# Patient Record
Sex: MALE | Race: WHITE | NOT HISPANIC OR LATINO | Employment: OTHER | URBAN - METROPOLITAN AREA
[De-identification: names, ages, dates, MRNs, and addresses within clinical notes are randomized per-mention and may not be internally consistent; named-entity substitution may affect disease eponyms.]

---

## 2019-08-01 ENCOUNTER — APPOINTMENT (OUTPATIENT)
Dept: RADIOLOGY | Facility: MEDICAL CENTER | Age: 74
DRG: 078 | End: 2019-08-01
Attending: NURSE PRACTITIONER
Payer: MEDICARE

## 2019-08-01 ENCOUNTER — APPOINTMENT (OUTPATIENT)
Dept: RADIOLOGY | Facility: MEDICAL CENTER | Age: 74
DRG: 078 | End: 2019-08-01
Attending: EMERGENCY MEDICINE
Payer: MEDICARE

## 2019-08-01 ENCOUNTER — HOSPITAL ENCOUNTER (OUTPATIENT)
Dept: RADIOLOGY | Facility: MEDICAL CENTER | Age: 74
End: 2019-08-01

## 2019-08-01 ENCOUNTER — HOSPITAL ENCOUNTER (INPATIENT)
Facility: MEDICAL CENTER | Age: 74
LOS: 4 days | DRG: 078 | End: 2019-08-05
Attending: EMERGENCY MEDICINE | Admitting: HOSPITALIST
Payer: MEDICARE

## 2019-08-01 ENCOUNTER — HOSPITAL ENCOUNTER (OUTPATIENT)
Facility: MEDICAL CENTER | Age: 74
End: 2019-08-01

## 2019-08-01 DIAGNOSIS — I16.0 HYPERTENSIVE URGENCY: ICD-10-CM

## 2019-08-01 DIAGNOSIS — R41.82 ALTERED MENTAL STATUS, UNSPECIFIED ALTERED MENTAL STATUS TYPE: ICD-10-CM

## 2019-08-01 DIAGNOSIS — I67.4 HYPERTENSIVE ENCEPHALOPATHY: ICD-10-CM

## 2019-08-01 PROBLEM — R53.1 ACUTE LEFT-SIDED WEAKNESS: Status: ACTIVE | Noted: 2019-08-01

## 2019-08-01 PROBLEM — E66.3 OVERWEIGHT (BMI 25.0-29.9): Status: ACTIVE | Noted: 2019-08-01

## 2019-08-01 PROBLEM — E87.1 HYPONATREMIA: Status: ACTIVE | Noted: 2019-08-01

## 2019-08-01 PROBLEM — R47.1 DYSARTHRIA: Status: ACTIVE | Noted: 2019-08-01

## 2019-08-01 PROBLEM — F10.10 ALCOHOL ABUSE: Status: ACTIVE | Noted: 2019-08-01

## 2019-08-01 LAB
ABO + RH BLD: NORMAL
ABO GROUP BLD: NORMAL
ALBUMIN SERPL BCP-MCNC: 4.3 G/DL (ref 3.2–4.9)
ALBUMIN/GLOB SERPL: 1.3 G/DL
ALP SERPL-CCNC: 104 U/L (ref 30–99)
ALT SERPL-CCNC: 13 U/L (ref 2–50)
ANION GAP SERPL CALC-SCNC: 10 MMOL/L (ref 0–11.9)
APPEARANCE UR: CLEAR
APTT PPP: 28 SEC (ref 24.7–36)
AST SERPL-CCNC: 29 U/L (ref 12–45)
BACTERIA #/AREA URNS HPF: NEGATIVE /HPF
BASOPHILS # BLD AUTO: 0.6 % (ref 0–1.8)
BASOPHILS # BLD: 0.04 K/UL (ref 0–0.12)
BILIRUB SERPL-MCNC: 1.2 MG/DL (ref 0.1–1.5)
BILIRUB UR QL STRIP.AUTO: NEGATIVE
BLD GP AB SCN SERPL QL: NORMAL
BUN SERPL-MCNC: 13 MG/DL (ref 8–22)
CALCIUM SERPL-MCNC: 9.6 MG/DL (ref 8.5–10.5)
CHLORIDE SERPL-SCNC: 96 MMOL/L (ref 96–112)
CO2 SERPL-SCNC: 23 MMOL/L (ref 20–33)
COLOR UR: YELLOW
CREAT SERPL-MCNC: 1.26 MG/DL (ref 0.5–1.4)
EKG IMPRESSION: NORMAL
EOSINOPHIL # BLD AUTO: 0.01 K/UL (ref 0–0.51)
EOSINOPHIL NFR BLD: 0.1 % (ref 0–6.9)
EPI CELLS #/AREA URNS HPF: NEGATIVE /HPF
ERYTHROCYTE [DISTWIDTH] IN BLOOD BY AUTOMATED COUNT: 48 FL (ref 35.9–50)
GLOBULIN SER CALC-MCNC: 3.3 G/DL (ref 1.9–3.5)
GLUCOSE SERPL-MCNC: 102 MG/DL (ref 65–99)
GLUCOSE UR STRIP.AUTO-MCNC: NEGATIVE MG/DL
HCT VFR BLD AUTO: 40.4 % (ref 42–52)
HGB BLD-MCNC: 14.1 G/DL (ref 14–18)
HYALINE CASTS #/AREA URNS LPF: ABNORMAL /LPF
IMM GRANULOCYTES # BLD AUTO: 0.04 K/UL (ref 0–0.11)
IMM GRANULOCYTES NFR BLD AUTO: 0.6 % (ref 0–0.9)
INR PPP: 1.02 (ref 0.87–1.13)
KETONES UR STRIP.AUTO-MCNC: NEGATIVE MG/DL
LEUKOCYTE ESTERASE UR QL STRIP.AUTO: ABNORMAL
LYMPHOCYTES # BLD AUTO: 0.74 K/UL (ref 1–4.8)
LYMPHOCYTES NFR BLD: 10.9 % (ref 22–41)
MCH RBC QN AUTO: 34.1 PG (ref 27–33)
MCHC RBC AUTO-ENTMCNC: 34.9 G/DL (ref 33.7–35.3)
MCV RBC AUTO: 97.6 FL (ref 81.4–97.8)
MICRO URNS: ABNORMAL
MONOCYTES # BLD AUTO: 0.52 K/UL (ref 0–0.85)
MONOCYTES NFR BLD AUTO: 7.7 % (ref 0–13.4)
NEUTROPHILS # BLD AUTO: 5.42 K/UL (ref 1.82–7.42)
NEUTROPHILS NFR BLD: 80.1 % (ref 44–72)
NITRITE UR QL STRIP.AUTO: NEGATIVE
NRBC # BLD AUTO: 0 K/UL
NRBC BLD-RTO: 0 /100 WBC
PH UR STRIP.AUTO: 6.5 [PH] (ref 5–8)
PLATELET # BLD AUTO: 150 K/UL (ref 164–446)
PMV BLD AUTO: 9.3 FL (ref 9–12.9)
POTASSIUM SERPL-SCNC: 4.2 MMOL/L (ref 3.6–5.5)
PROT SERPL-MCNC: 7.6 G/DL (ref 6–8.2)
PROT UR QL STRIP: NEGATIVE MG/DL
PROTHROMBIN TIME: 13.6 SEC (ref 12–14.6)
RBC # BLD AUTO: 4.14 M/UL (ref 4.7–6.1)
RBC # URNS HPF: ABNORMAL /HPF
RBC UR QL AUTO: NEGATIVE
RH BLD: NORMAL
SODIUM SERPL-SCNC: 129 MMOL/L (ref 135–145)
SP GR UR STRIP.AUTO: 1.01
TROPONIN T SERPL-MCNC: 18 NG/L (ref 6–19)
UROBILINOGEN UR STRIP.AUTO-MCNC: 0.2 MG/DL
WBC # BLD AUTO: 6.8 K/UL (ref 4.8–10.8)
WBC #/AREA URNS HPF: ABNORMAL /HPF

## 2019-08-01 PROCEDURE — 81001 URINALYSIS AUTO W/SCOPE: CPT

## 2019-08-01 PROCEDURE — 83036 HEMOGLOBIN GLYCOSYLATED A1C: CPT

## 2019-08-01 PROCEDURE — 96375 TX/PRO/DX INJ NEW DRUG ADDON: CPT

## 2019-08-01 PROCEDURE — 84484 ASSAY OF TROPONIN QUANT: CPT

## 2019-08-01 PROCEDURE — 93005 ELECTROCARDIOGRAM TRACING: CPT | Performed by: EMERGENCY MEDICINE

## 2019-08-01 PROCEDURE — 770006 HCHG ROOM/CARE - MED/SURG/GYN SEMI*

## 2019-08-01 PROCEDURE — 0042T CT-CEREBRAL PERFUSION ANALYSIS: CPT

## 2019-08-01 PROCEDURE — 96366 THER/PROPH/DIAG IV INF ADDON: CPT

## 2019-08-01 PROCEDURE — 80053 COMPREHEN METABOLIC PANEL: CPT

## 2019-08-01 PROCEDURE — 700101 HCHG RX REV CODE 250: Performed by: HOSPITALIST

## 2019-08-01 PROCEDURE — 86901 BLOOD TYPING SEROLOGIC RH(D): CPT

## 2019-08-01 PROCEDURE — 700111 HCHG RX REV CODE 636 W/ 250 OVERRIDE (IP): Performed by: EMERGENCY MEDICINE

## 2019-08-01 PROCEDURE — 85730 THROMBOPLASTIN TIME PARTIAL: CPT

## 2019-08-01 PROCEDURE — 36415 COLL VENOUS BLD VENIPUNCTURE: CPT

## 2019-08-01 PROCEDURE — 70498 CT ANGIOGRAPHY NECK: CPT

## 2019-08-01 PROCEDURE — 99284 EMERGENCY DEPT VISIT MOD MDM: CPT | Performed by: PSYCHIATRY & NEUROLOGY

## 2019-08-01 PROCEDURE — 86900 BLOOD TYPING SEROLOGIC ABO: CPT

## 2019-08-01 PROCEDURE — 70496 CT ANGIOGRAPHY HEAD: CPT

## 2019-08-01 PROCEDURE — 99223 1ST HOSP IP/OBS HIGH 75: CPT | Performed by: HOSPITALIST

## 2019-08-01 PROCEDURE — 99285 EMERGENCY DEPT VISIT HI MDM: CPT

## 2019-08-01 PROCEDURE — 86850 RBC ANTIBODY SCREEN: CPT

## 2019-08-01 PROCEDURE — 85610 PROTHROMBIN TIME: CPT

## 2019-08-01 PROCEDURE — 71045 X-RAY EXAM CHEST 1 VIEW: CPT

## 2019-08-01 PROCEDURE — 700117 HCHG RX CONTRAST REV CODE 255: Performed by: EMERGENCY MEDICINE

## 2019-08-01 PROCEDURE — 70551 MRI BRAIN STEM W/O DYE: CPT

## 2019-08-01 PROCEDURE — 85025 COMPLETE CBC W/AUTO DIFF WBC: CPT

## 2019-08-01 PROCEDURE — 96365 THER/PROPH/DIAG IV INF INIT: CPT

## 2019-08-01 RX ORDER — ASPIRIN 81 MG/1
324 TABLET, CHEWABLE ORAL DAILY
Status: DISCONTINUED | OUTPATIENT
Start: 2019-08-01 | End: 2019-08-04

## 2019-08-01 RX ORDER — LORAZEPAM 2 MG/ML
0.5 INJECTION INTRAMUSCULAR EVERY 4 HOURS PRN
Status: DISCONTINUED | OUTPATIENT
Start: 2019-08-01 | End: 2019-08-05 | Stop reason: HOSPADM

## 2019-08-01 RX ORDER — CLOPIDOGREL BISULFATE 75 MG/1
75 TABLET ORAL DAILY
Status: DISCONTINUED | OUTPATIENT
Start: 2019-08-01 | End: 2019-08-05 | Stop reason: HOSPADM

## 2019-08-01 RX ORDER — FOLIC ACID 1 MG/1
1 TABLET ORAL DAILY
Status: DISCONTINUED | OUTPATIENT
Start: 2019-08-02 | End: 2019-08-05 | Stop reason: HOSPADM

## 2019-08-01 RX ORDER — AMLODIPINE BESYLATE 2.5 MG/1
2.5 TABLET ORAL
Status: DISCONTINUED | OUTPATIENT
Start: 2019-08-02 | End: 2019-08-02

## 2019-08-01 RX ORDER — ONDANSETRON 2 MG/ML
4 INJECTION INTRAMUSCULAR; INTRAVENOUS EVERY 4 HOURS PRN
Status: DISCONTINUED | OUTPATIENT
Start: 2019-08-01 | End: 2019-08-05 | Stop reason: HOSPADM

## 2019-08-01 RX ORDER — AMOXICILLIN 250 MG
2 CAPSULE ORAL 2 TIMES DAILY
Status: DISCONTINUED | OUTPATIENT
Start: 2019-08-02 | End: 2019-08-05 | Stop reason: HOSPADM

## 2019-08-01 RX ORDER — ONDANSETRON 4 MG/1
4 TABLET, ORALLY DISINTEGRATING ORAL EVERY 4 HOURS PRN
Status: DISCONTINUED | OUTPATIENT
Start: 2019-08-01 | End: 2019-08-05 | Stop reason: HOSPADM

## 2019-08-01 RX ORDER — LISINOPRIL 10 MG/1
10 TABLET ORAL TWICE DAILY
Status: DISCONTINUED | OUTPATIENT
Start: 2019-08-01 | End: 2019-08-02

## 2019-08-01 RX ORDER — LORAZEPAM 2 MG/1
4 TABLET ORAL
Status: DISCONTINUED | OUTPATIENT
Start: 2019-08-01 | End: 2019-08-05 | Stop reason: HOSPADM

## 2019-08-01 RX ORDER — POLYETHYLENE GLYCOL 3350 17 G/17G
1 POWDER, FOR SOLUTION ORAL
Status: DISCONTINUED | OUTPATIENT
Start: 2019-08-01 | End: 2019-08-05 | Stop reason: HOSPADM

## 2019-08-01 RX ORDER — ASPIRIN 325 MG
325 TABLET ORAL DAILY
Status: DISCONTINUED | OUTPATIENT
Start: 2019-08-01 | End: 2019-08-04

## 2019-08-01 RX ORDER — LORAZEPAM 2 MG/1
2 TABLET ORAL
Status: DISCONTINUED | OUTPATIENT
Start: 2019-08-01 | End: 2019-08-05 | Stop reason: HOSPADM

## 2019-08-01 RX ORDER — LORAZEPAM 2 MG/ML
2 INJECTION INTRAMUSCULAR
Status: DISCONTINUED | OUTPATIENT
Start: 2019-08-01 | End: 2019-08-05 | Stop reason: HOSPADM

## 2019-08-01 RX ORDER — LORAZEPAM 1 MG/1
0.5 TABLET ORAL EVERY 4 HOURS PRN
Status: DISCONTINUED | OUTPATIENT
Start: 2019-08-01 | End: 2019-08-05 | Stop reason: HOSPADM

## 2019-08-01 RX ORDER — AMLODIPINE BESYLATE 5 MG/1
2.5 TABLET ORAL DAILY
Status: DISCONTINUED | OUTPATIENT
Start: 2019-08-01 | End: 2019-08-01

## 2019-08-01 RX ORDER — THIAMINE MONONITRATE (VIT B1) 100 MG
100 TABLET ORAL DAILY
Status: DISCONTINUED | OUTPATIENT
Start: 2019-08-02 | End: 2019-08-05 | Stop reason: HOSPADM

## 2019-08-01 RX ORDER — LORAZEPAM 2 MG/ML
1 INJECTION INTRAMUSCULAR ONCE
Status: COMPLETED | OUTPATIENT
Start: 2019-08-01 | End: 2019-08-01

## 2019-08-01 RX ORDER — LORAZEPAM 1 MG/1
1 TABLET ORAL EVERY 4 HOURS PRN
Status: DISCONTINUED | OUTPATIENT
Start: 2019-08-01 | End: 2019-08-05 | Stop reason: HOSPADM

## 2019-08-01 RX ORDER — LISINOPRIL 10 MG/1
10 TABLET ORAL 2 TIMES DAILY
Status: DISCONTINUED | OUTPATIENT
Start: 2019-08-01 | End: 2019-08-01

## 2019-08-01 RX ORDER — ASPIRIN 300 MG/1
300 SUPPOSITORY RECTAL DAILY
Status: DISCONTINUED | OUTPATIENT
Start: 2019-08-01 | End: 2019-08-04

## 2019-08-01 RX ORDER — LORAZEPAM 2 MG/ML
1 INJECTION INTRAMUSCULAR
Status: DISCONTINUED | OUTPATIENT
Start: 2019-08-01 | End: 2019-08-05 | Stop reason: HOSPADM

## 2019-08-01 RX ORDER — ACETAMINOPHEN 325 MG/1
650 TABLET ORAL EVERY 6 HOURS PRN
Status: DISCONTINUED | OUTPATIENT
Start: 2019-08-01 | End: 2019-08-05 | Stop reason: HOSPADM

## 2019-08-01 RX ORDER — ATORVASTATIN CALCIUM 40 MG/1
40 TABLET, FILM COATED ORAL
Status: DISCONTINUED | OUTPATIENT
Start: 2019-08-01 | End: 2019-08-02

## 2019-08-01 RX ORDER — LORAZEPAM 2 MG/ML
1.5 INJECTION INTRAMUSCULAR
Status: DISCONTINUED | OUTPATIENT
Start: 2019-08-01 | End: 2019-08-05 | Stop reason: HOSPADM

## 2019-08-01 RX ORDER — BISACODYL 10 MG
10 SUPPOSITORY, RECTAL RECTAL
Status: DISCONTINUED | OUTPATIENT
Start: 2019-08-01 | End: 2019-08-05 | Stop reason: HOSPADM

## 2019-08-01 RX ADMIN — IOHEXOL 100 ML: 350 INJECTION, SOLUTION INTRAVENOUS at 14:45

## 2019-08-01 RX ADMIN — LORAZEPAM 1 MG: 2 INJECTION INTRAMUSCULAR; INTRAVENOUS at 17:46

## 2019-08-01 RX ADMIN — IOHEXOL 40 ML: 350 INJECTION, SOLUTION INTRAVENOUS at 14:45

## 2019-08-01 RX ADMIN — THIAMINE HYDROCHLORIDE: 100 INJECTION, SOLUTION INTRAMUSCULAR; INTRAVENOUS at 18:37

## 2019-08-01 ASSESSMENT — LIFESTYLE VARIABLES
ON A TYPICAL DAY WHEN YOU DRINK ALCOHOL HOW MANY DRINKS DO YOU HAVE: 6
HAVE YOU EVER FELT YOU SHOULD CUT DOWN ON YOUR DRINKING: YES
TOTAL SCORE: 4
AVERAGE NUMBER OF DAYS PER WEEK YOU HAVE A DRINK CONTAINING ALCOHOL: 7
TOTAL SCORE: 4
HOW MANY TIMES IN THE PAST YEAR HAVE YOU HAD 5 OR MORE DRINKS IN A DAY: 365
CONSUMPTION TOTAL: POSITIVE
EVER_SMOKED: YES
DOES PATIENT WANT TO STOP DRINKING: NO
TOTAL SCORE: 4
DOES PATIENT WANT TO TALK TO SOMEONE ABOUT QUITTING: NO
ALCOHOL_USE: YES
HAVE PEOPLE ANNOYED YOU BY CRITICIZING YOUR DRINKING: YES
EVER HAD A DRINK FIRST THING IN THE MORNING TO STEADY YOUR NERVES TO GET RID OF A HANGOVER: YES
SUBSTANCE_ABUSE: 1
EVER FELT BAD OR GUILTY ABOUT YOUR DRINKING: YES

## 2019-08-01 ASSESSMENT — ENCOUNTER SYMPTOMS
ABDOMINAL PAIN: 0
FOCAL WEAKNESS: 0
SORE THROAT: 0
SENSORY CHANGE: 0
TREMORS: 0
NAUSEA: 0
MYALGIAS: 0
DOUBLE VISION: 0
PALPITATIONS: 0
DEPRESSION: 0
NECK PAIN: 0
SHORTNESS OF BREATH: 0
DIZZINESS: 0
HEADACHES: 0
COUGH: 0

## 2019-08-01 ASSESSMENT — PATIENT HEALTH QUESTIONNAIRE - PHQ9
SUM OF ALL RESPONSES TO PHQ9 QUESTIONS 1 AND 2: 0
2. FEELING DOWN, DEPRESSED, IRRITABLE, OR HOPELESS: NOT AT ALL
1. LITTLE INTEREST OR PLEASURE IN DOING THINGS: NOT AT ALL

## 2019-08-01 NOTE — ED NOTES
Report from Sharath RN, assumed care of pt.     Pt sitting up in stretcher, alert to self, time and event only. No facial droop noted. Speech clear but appears to have difficulty finding words at times. No extremity weakness noted. Denies pain.

## 2019-08-01 NOTE — CONSULTS
Chief Complaint   Patient presents with   • Possible Stroke     onset of confusion and difficulty speaking this morning at 0900.       Problem List Items Addressed This Visit     None      Neurology Stroke Alert Consultation     History of present illness:  This is a 74-year old male with PMHx significant for hypertension, dyslipidemia, TIAs (remotely) who presented to [facility] as a transfer from [facility] on 8/1/19 for a chief complaint of confusion and hypertensive crisis. HPI obtained from EMS at time of arrival. Patient was reportedly LKW around 0900 this morning; went to grocery store, was seen leaving and walking to his car; 1-2 hours later, patient was found by bystanders confused and with minimal verbal output. EMS was called; on scene SBP 200s, reportedly up to 260 systolic. Some reports of Left sided weakness as well. Patient was thus brought to [place] for further evaluation; at time of presentation there, CT head revealed no acute intracranial abnormality. Patient was ultimately transferred to [facility] for further work up/higher level of care. At time of presentation here, SBP 190s. NIHSS 1-2, significant only for mild confusion and question of mild word finding difficulty/fragmented speech. CTA head/neck at time of presentation here with no acute thrombus or LVO; did not multiple areas of calcification/atherosclerotic disease involving posterior circulation. Patient ultimately determined not to be a candidate for IV tPA secondary to mild/non disabling/ non focal deficits; patient also outside of tPA time window.   Currently, patient is sitting up in bed; awake and alert, still with mild confusion and word finding difficulty. Denies headache or dizziness. Denies numbness or paresthesia to any part of the body. Denies unilateral/focal weakness to any part of the body. Denies problem with vision, speech or swallowing. Denies chest pain or heart palpitations.     Neurology has been consulted by  Dr. Ritesh Gomez to further evaluate the findings noted above.     Past medical history:   Past Medical History:   Diagnosis Date   • Hyperlipidemia    • Hypertension    • TIA (transient ischemic attack)        Past surgical history:   Non contributory.     Family history:   Non contributory.     Social history:   Social History     Socioeconomic History   • Marital status:      Spouse name: Not on file   • Number of children: Not on file   • Years of education: Not on file   • Highest education level: Not on file   Occupational History   • Not on file   Social Needs   • Financial resource strain: Not on file   • Food insecurity:     Worry: Not on file     Inability: Not on file   • Transportation needs:     Medical: Not on file     Non-medical: Not on file   Tobacco Use   • Smoking status: Former Smoker     Last attempt to quit: 1999     Years since quittin.0   • Smokeless tobacco: Never Used   Substance and Sexual Activity   • Alcohol use: Yes   • Drug use: Never   • Sexual activity: Not on file   Lifestyle   • Physical activity:     Days per week: Not on file     Minutes per session: Not on file   • Stress: Not on file   Relationships   • Social connections:     Talks on phone: Not on file     Gets together: Not on file     Attends Orthodoxy service: Not on file     Active member of club or organization: Not on file     Attends meetings of clubs or organizations: Not on file     Relationship status: Not on file   • Intimate partner violence:     Fear of current or ex partner: Not on file     Emotionally abused: Not on file     Physically abused: Not on file     Forced sexual activity: Not on file   Other Topics Concern   • Not on file   Social History Narrative   • Not on file       Current medications:   No current facility-administered medications for this encounter.      Current Outpatient Medications   Medication   • lisinopril (PRINIVIL) 10 MG Tab   • clopidogrel (PLAVIX) 75 MG Tab   •  amLODIPine (NORVASC) 2.5 MG Tab   • atorvastatin (LIPITOR) 20 MG Tab       Medication Allergy:  No Known Allergies    Review of systems:   Constitutional: denies fever, night sweats, weight loss.   Eyes: denies acute vision change, eye pain or secretion.   Ears, Nose, Mouth, Throat: denies nasal secretion, nasal bleeding, difficulty swallowing, hearing loss, tinnitus, vertigo, ear pain, acute dental problems, oral ulcers or lesions.   Endocrine: denies recent weight changes, heat or cold intolerance, polyuria, polydypsia, polyphagia,abnormal hair growth.  Cardiovascular: denies new onset of chest pain, palpitations, syncope, or dyspnea of exertion.  Pulmonary: denies shortness of breath, new onset of cough, hemoptysis, wheezing, chest pain or flu-like symptoms.   GI: denies nausea, vomiting, diarrhea, GI bleeding, change in appetite, abdominal pain, and change in bowel habits.  : denies dysuria, urinary incontinence, hematuria.  Heme/oncology: denies history of easy bruising or bleeding. No history of cancer, DVTor PE.  Allergy/immunology: denies hives/urticaria, or itching.   Dermatologic: denies new rash, or new skin lesions.  Musculoskeletal:denies joint swelling or pain, muscle pain, neck and back pain. Neurologic: denies headaches, acute visual changes, facial droopiness, muscle weakness (focal or generalized), paresthesias, anesthesia, ataxia, change in speech or language, memory loss, abnormal movements, seizures, loss of consciousness, or episodes of confusion.   Psychiatric: denies symptoms of depression, anxiety, hallucinations, mood swings or changes, suicidal or homicidal thoughts.     Physical examination:   Vitals:    08/01/19 1422   BP: (!) 173/89   Pulse: 92   SpO2: 95%   Weight: 99.8 kg (220 lb)   Height: 1.829 m (6')     General: Patient in no acute distress  HEENT: Normocephalic, no signs of acute trauma.   Neck: supple, no meningeal signs or carotid bruits. There is normal range of motion. No  tenderness on exam.   Chest: clear to auscultation. No cough.   CV: RRR, no murmurs.   Skin: no signs of acute rashes or trauma.   Musculoskeletal: joints exhibit full range of motion, without any pain to palpation. There are no signs of joint or muscle swelling. There is no tenderness to deep palpation of muscles.   Psychiatric: No hallucinatory behavior. Denies symptoms of depression or suicidal ideation. Mood and affect appear normal on exam.     NEUROLOGICAL EXAM:   Mental status, orientation: Awake, alert and fully oriented.   Speech and language: speech is clear, however confused/fragmented at times with some minimal word finding difficulty.   Cranial nerve exam: Pupils are 3-4 mm bilaterally and equally reactive to light and accommodation. Visual fields are intact by confrontation. There is no nystagmus on primary or secondary gaze. Intact full EOM in all directions of gaze. Face appears symmetric. Sensation in the face is intact to light touch.Tongue is midline and without any signs of tongue biting or fasciculations Shoulder shrug is intact bilaterally.   Motor exam: Strength is 5/5 in all extremities. Tone is normal. No abnormal movements were seen on exam.   Sensory exam reveals normal sense of light touch and pinprick in all extremities.   Deep tendon reflexes:  2+ throughout. Plantar responses are flexor. There is no clonus.   Coordination: shows a normal finger-nose-finger. Normal rapidly alternating movements.   Gait: Not assessed at this time as patient is a fall risk.       NIH Stroke Scale    1a Level of Consciousness   1b Orientation Questions 1  1c Response to Commands   2 Gaze   3 Visual Fields   4 Facial Movement   5 Motor Function (arm)   a Left   b Right   6 Motor Function (leg)   a Left   b Right   7 Limb Ataxia   8 Sensory   9 Language 1  10 Articulation   11 Extinction/Inattention     Score: 2    ANCILLARY DATA REVIEWED:     Lab Data Review:  Recent Results (from the past 24 hour(s))   CBC  WITH DIFFERENTIAL    Collection Time: 08/01/19 11:25 AM   Result Value Ref Range    WBC 7.2 4.8 - 10.8 K/uL    RBC 4.23 (L) 4.70 - 6.10 M/uL    Hemoglobin 14.2 14.0 - 18.0 g/dL    Hematocrit 40.1 (L) 42.0 - 52.0 %    MCV 94.8 (H) 80.0 - 94.0 fL    MCH 33.6 (H) 28.7 - 33.1 pg    MCHC 35.4 33.0 - 37.0 g/dL    RDW 13.2 11.5 - 14.5 %    Platelet Count 174 130 - 400 K/uL    MPV 9.3 7.4 - 10.4 fL    Neutrophils Automated 78.9 (H) 39.0 - 70.0 %    Lymphocytes Automated 11.7 (L) 21.0 - 50.0 %    Monocytes Automated 8.7 1.7 - 10.0 %    Eosinophils Automated 0.3 0.0 - 5.0 %    Basophils Automated 0.4 0.0 - 3.0 %    Abs Neutrophils Automated 5.6 1.8 - 7.7 K/uL    Abs Lymph Automated 0.8 (L) 1.2 - 4.8 K/uL    Monos (Absolute) 0.6 0.2 - 0.9 K/uL   COMP METABOLIC PANEL    Collection Time: 08/01/19 11:25 AM   Result Value Ref Range    Sodium 130 (L) 136 - 145 mmol/L    Potassium 4.2 3.5 - 5.1 mmol/L    Chloride 97 (L) 98 - 107 mmol/L    Co2 19 (L) 20 - 29 mmol/L    Anion Gap 14 (H) 4 - 12 mmol/L    Glucose 103 (H) 70 - 100 mg/dL    Bun 12 9 - 25 mg/dL    Creatinine 1.3 0.7 - 1.3 mg/dL    Calcium 9.3 8.5 - 10.1 mg/dL    AST(SGOT) 37 10 - 37 U/L    ALT(SGPT) 20 12 - 78 U/L    Alkaline Phosphatase 118 34 - 120 U/L    Total Bilirubin 1.2 0.1 - 1.2 mg/dL    Albumin 4.1 3.5 - 5.0 g/dL    Total Protein 8.0 6.4 - 8.3 g/dL    A-G Ratio 1.1    TROPONIN    Collection Time: 08/01/19 11:25 AM   Result Value Ref Range    Troponin I <0.02 0.00 - 0.06 ng/mL   LACTIC ACID    Collection Time: 08/01/19 11:25 AM   Result Value Ref Range    Lactic Acid 1.5 0.0 - 2.0 mmol/L   ESTIMATED GFR    Collection Time: 08/01/19 11:25 AM   Result Value Ref Range    GFR If African American >60 >60 mL/min/1.73 m 2    GFR If Non African American 54 (A) >60 mL/min/1.73 m 2   CBC WITH DIFFERENTIAL    Collection Time: 08/01/19  2:00 PM   Result Value Ref Range    WBC 6.8 4.8 - 10.8 K/uL    RBC 4.14 (L) 4.70 - 6.10 M/uL    Hemoglobin 14.1 14.0 - 18.0 g/dL    Hematocrit  40.4 (L) 42.0 - 52.0 %    MCV 97.6 81.4 - 97.8 fL    MCH 34.1 (H) 27.0 - 33.0 pg    MCHC 34.9 33.7 - 35.3 g/dL    RDW 48.0 35.9 - 50.0 fL    Platelet Count 150 (L) 164 - 446 K/uL    MPV 9.3 9.0 - 12.9 fL    Neutrophils-Polys 80.10 (H) 44.00 - 72.00 %    Lymphocytes 10.90 (L) 22.00 - 41.00 %    Monocytes 7.70 0.00 - 13.40 %    Eosinophils 0.10 0.00 - 6.90 %    Basophils 0.60 0.00 - 1.80 %    Immature Granulocytes 0.60 0.00 - 0.90 %    Nucleated RBC 0.00 /100 WBC    Neutrophils (Absolute) 5.42 1.82 - 7.42 K/uL    Lymphs (Absolute) 0.74 (L) 1.00 - 4.80 K/uL    Monos (Absolute) 0.52 0.00 - 0.85 K/uL    Eos (Absolute) 0.01 0.00 - 0.51 K/uL    Baso (Absolute) 0.04 0.00 - 0.12 K/uL    Immature Granulocytes (abs) 0.04 0.00 - 0.11 K/uL    NRBC (Absolute) 0.00 K/uL   COMP METABOLIC PANEL    Collection Time: 08/01/19  2:00 PM   Result Value Ref Range    Sodium 129 (L) 135 - 145 mmol/L    Potassium 4.2 3.6 - 5.5 mmol/L    Chloride 96 96 - 112 mmol/L    Co2 23 20 - 33 mmol/L    Anion Gap 10.0 0.0 - 11.9    Glucose 102 (H) 65 - 99 mg/dL    Bun 13 8 - 22 mg/dL    Creatinine 1.26 0.50 - 1.40 mg/dL    Calcium 9.6 8.5 - 10.5 mg/dL    AST(SGOT) 29 12 - 45 U/L    ALT(SGPT) 13 2 - 50 U/L    Alkaline Phosphatase 104 (H) 30 - 99 U/L    Total Bilirubin 1.2 0.1 - 1.5 mg/dL    Albumin 4.3 3.2 - 4.9 g/dL    Total Protein 7.6 6.0 - 8.2 g/dL    Globulin 3.3 1.9 - 3.5 g/dL    A-G Ratio 1.3 g/dL   PROTHROMBIN TIME    Collection Time: 08/01/19  2:00 PM   Result Value Ref Range    PT 13.6 12.0 - 14.6 sec    INR 1.02 0.87 - 1.13   APTT    Collection Time: 08/01/19  2:00 PM   Result Value Ref Range    APTT 28.0 24.7 - 36.0 sec   TROPONIN    Collection Time: 08/01/19  2:00 PM   Result Value Ref Range    Troponin T 18 6 - 19 ng/L   ESTIMATED GFR    Collection Time: 08/01/19  2:00 PM   Result Value Ref Range    GFR If African American >60 >60 mL/min/1.73 m 2    GFR If Non  56 (A) >60 mL/min/1.73 m 2       Labs reviewed by me.     Imaging  reviewed by me:     DX-CHEST-PORTABLE (1 VIEW)   Final Result      Borderline cardiomegaly.      CT-CTA HEAD WITH & W/O-POST PROCESS   Final Result      CT angiogram of the [place] within normal limits.   Cerebral atrophy and central microvascular ischemic changes.   No acute intracranial hemorrhage.      CT-CTA NECK WITH & W/O-POST PROCESSING   Final Result      1.  Atherosclerotic plaque at the carotid bifurcations bilaterally with extension into the internal carotid arteries. This results in less than 50% diameter narrowing.      2.  Scattered atherosclerotic plaque involving the vertebral arteries. No evidence of occlusion or dissection.      CT-CEREBRAL PERFUSION ANALYSIS   Final Result      1.  Cerebral blood flow less than 30% likely representing completed infarct = 0 mL.      2.  T Max more than 6 seconds likely representing combination of completed infarct and ischemia = 115 mL.      3.  Mismatched volume likely representing ischemic brain/penumbra = 115 mL      4.  Please note that the cerebral perfusion was performed on the limited brain tissue around the basal ganglia region. Infarct/ischemia outside the CT perfusion sections can be missed in this study.      OUTSIDE IMAGES-CT HEAD   Final Result          Presumed mechanism by TOAST:  __Large Artery Atherosclerosis  __Small Vessel (Lacunar)  __Cardioembolic  __Other (Sickle Cell, Vasculitis, Hypercoagulable)  _X_Unknown      ASSESSMENT AND PLAN:  74-year old male with PMHx significant for hypertension, dyslipidemia, TIAs (remotely) who presented to [facility] as a transfer from [facility] on 8/1/19 for a chief complaint of confusion and hypertensive crisis;  at OSH prior to tranfer. NIHSS 1-2 at time of presentation here significant only for mild confusion and mild word finding difficulty; no other focal deficits appreciated. SBP currently 170s-190s. CT head, CTA head and neck with no LVO/acute thrombus. Patient ultimately  determined not to be a candidate for IV tPA secondary to mild/non disabling neurological deficits and higher suspicion for a hypertensive encephalopathy; patient also outside of tPA time window.  Regardless will pursue further stroke work up including MRI Brain, TTE.     Impression:   Hypertensive Crisis.   Hypertensive encephalopathy secondary to the above vs. Acute ischemic stroke.   Hx of hypertension.   Hx of dyslipidemia.   Hx of TIAs.     Recommendations/Plan:     -q4h and PRN neuro assessment. VS per nursing/unit protocol. Permissive HTN ok x 24 hours, not to exceed SBP > 200, DPB > 105. If MRI negative, BP goal 140/90 with slow downard titration so as to not hypoperfuse.   -Obtain MRI Brain wo contrast.   -Telemetry; currently SR. Screen for Afib/arrhythmia. Obtain TTE with bubble study.   -Give  mg PO now STAT and q day.   -Atorvastatin 80 mg PO q HS. Check lipid panel.   -Check CBC, CMP, Coags, Troponin, UA, TSH.   -Recommend aggressive BG management per primary team. Avoid IVF with Dextrose. Check hemoglobin A1c.   -PT/OT/SLP eval and treat.   -All other medical management per primary team.   -DVT Prophylaxis: SCDs.     Will follow up with results of the above and make additional recommendations accordingly. The plan of care above has been discussed with Dr. Camp.     Erica Gastelum MSN, BSN, AGNP-C  [facility]

## 2019-08-01 NOTE — ED NOTES
Med Rec completed per patient's home pharmacy   Allergies reviewed  No ORAL antibiotics in last 14 days    Patient states that it has been about a week since he has taken any of his medications

## 2019-08-01 NOTE — ED NOTES
Pt requesting to use restroom, up to bathroom with one person assist. Gait noted to NOT be steady without assistance.     Pt assisted to bathroom with this RN, urine cup given to provide specimen. Pt confused while trying to give sample. Small amount obtained, sent to lab.

## 2019-08-01 NOTE — ED TRIAGE NOTES
Chief Complaint   Patient presents with   • Possible Stroke     onset of confusion and difficulty speaking this morning at 0900.     BIB [facility] from [facility] for above. Given NTG paste to right upper chest and ASA PTA. Explained triage process, to waiting room. Asked to inform RN if questions or concerns arise.

## 2019-08-01 NOTE — ED PROVIDER NOTES
ED Provider Note    CHIEF COMPLAINT  Chief Complaint   Patient presents with   • Possible Stroke     onset of confusion and difficulty speaking this morning at 0900.       HPI  Phillip Christopher is a 74 y.o. male who presents with confusion.  The patient was transferred emergently from [facility] for possible CVA.  The last time the patient was seen normal was around 9 AM.  The patient was found in his car at the hospital with confusion and inability to get out of his car.  The patient was aware of the confusion.  They are concerned for some word salad as well and the patient had a NIH stroke scale of 4 at their facility and he was transferred to [facility] for possible emergent neurologic intervention.  On arrival the patient does not have any paresthesias or functional loss of his extremities.  He does have continued confusion.  He does not have any pain.    REVIEW OF SYSTEMS  See HPI for further details. All other systems are negative.     PAST MEDICAL HISTORY  Past Medical History:   Diagnosis Date   • Hyperlipidemia    • Hypertension    • TIA (transient ischemic attack)        FAMILY HISTORY  [unfilled]    SOCIAL HISTORY  Social History     Socioeconomic History   • Marital status:      Spouse name: Not on file   • Number of children: Not on file   • Years of education: Not on file   • Highest education level: Not on file   Occupational History   • Not on file   Social Needs   • Financial resource strain: Not on file   • Food insecurity:     Worry: Not on file     Inability: Not on file   • Transportation needs:     Medical: Not on file     Non-medical: Not on file   Tobacco Use   • Smoking status: Former Smoker     Last attempt to quit: 1999     Years since quittin.0   • Smokeless tobacco: Never Used   Substance and Sexual Activity   • Alcohol use: Yes   • Drug use: Never   • Sexual activity: Not on file   Lifestyle   • Physical activity:     Days per week: Not on file     Minutes  per session: Not on file   • Stress: Not on file   Relationships   • Social connections:     Talks on phone: Not on file     Gets together: Not on file     Attends Buddhist service: Not on file     Active member of club or organization: Not on file     Attends meetings of clubs or organizations: Not on file     Relationship status: Not on file   • Intimate partner violence:     Fear of current or ex partner: Not on file     Emotionally abused: Not on file     Physically abused: Not on file     Forced sexual activity: Not on file   Other Topics Concern   • Not on file   Social History Narrative   • Not on file       SURGICAL HISTORY  No past surgical history on file.    CURRENT MEDICATIONS  Home Medications    **Home medications have not yet been reviewed for this encounter**         ALLERGIES  No Known Allergies    PHYSICAL EXAM  VITAL SIGNS: BP (!) 173/89   Pulse 92   Ht 1.829 m (6')   Wt 99.8 kg (220 lb)   SpO2 95%   BMI 29.84 kg/m²       Constitutional: Well developed, Well nourished, No acute distress, Non-toxic appearance.   HENT: Normocephalic, Atraumatic, Bilateral external ears normal, Oropharynx moist, No oral exudates, Nose normal.   Eyes: PERRLA, EOMI, Conjunctiva normal, No discharge.   Neck: Normal range of motion, No tenderness, Supple, No stridor.   Lymphatic: No lymphadenopathy noted.   Cardiovascular: Normal heart rate, Normal rhythm, No murmurs, No rubs, No gallops.   Thorax & Lungs: Normal breath sounds, No respiratory distress, No wheezing, No chest tenderness.   Abdomen: Bowel sounds normal, Soft, No tenderness, No masses, No pulsatile masses.   Skin: Warm, Dry, No erythema, No rash.   Back: No tenderness, No CVA tenderness.   Genitalia: External genitalia appear normal, No masses or lesions. No discharge.   Rectal: Normal appearance, Normal sphincter tone. No external or internal lesions noted. Stool is normal color and heme negative.   Extremities: Intact distal pulses, No edema, No  tenderness, No cyanosis, No clubbing.   Musculoskeletal: Good range of motion in all major joints. No tenderness to palpation or major deformities noted.   Neurologic: Alert & oriented x 1, Normal motor function, Normal sensory function, No focal deficits noted.  NIH stroke scale of 2 as the patient does not know the day nor the year.  Psychiatric: Affect normal, Judgment normal, Mood normal.     RADIOLOGY/PROCEDURES  DX-CHEST-PORTABLE (1 VIEW)   Final Result      Borderline cardiomegaly.      CT-CTA HEAD WITH & W/O-POST PROCESS   Final Result      CT angiogram of the [place] within normal limits.   Cerebral atrophy and central microvascular ischemic changes.   No acute intracranial hemorrhage.      CT-CTA NECK WITH & W/O-POST PROCESSING   Final Result      1.  Atherosclerotic plaque at the carotid bifurcations bilaterally with extension into the internal carotid arteries. This results in less than 50% diameter narrowing.      2.  Scattered atherosclerotic plaque involving the vertebral arteries. No evidence of occlusion or dissection.      CT-CEREBRAL PERFUSION ANALYSIS   Final Result      1.  Cerebral blood flow less than 30% likely representing completed infarct = 0 mL.      2.  T Max more than 6 seconds likely representing combination of completed infarct and ischemia = 115 mL.      3.  Mismatched volume likely representing ischemic brain/penumbra = 115 mL      4.  Please note that the cerebral perfusion was performed on the limited brain tissue around the basal ganglia region. Infarct/ischemia outside the CT perfusion sections can be missed in this study.      OUTSIDE IMAGES-CT HEAD   Final Result            COURSE & MEDICAL DECISION MAKING  Pertinent Labs & Imaging studies reviewed. (See chart for details)  This a 74-year-old gentleman who presents the emerge department with confusion.  The differential diagnosis would include ischemic event, hypertensive encephalopathy, metabolic insult, or  infection.  Clinically do not appreciate any evidence of infectious process and the patient does not appear toxic.  The patient did have a CT perfusion study does show some mismatch area of approximate 115 mL's however the patient is not a candidate for thrombolytics as the last time the patient was normal was around 9 AM.  CT angiogram does not show any lesion that would be amenable to localized thrombolyzes.  Neurology was consulted and the patient be medically managed.  Hypertensive encephalopathy would be high on the differential.  The patient is some slight decrease in his sodium but not severe enough to cause his presenting symptoms.  The patient's exam is remained unchanged on repeat exams in the emerge department.  He will be admitted to the hospitalist in stable condition.    FINAL IMPRESSION  1.  Confusion  2.  Suspect hypertensive encephalopathy    Disposition  The patient will be admitted in stable condition      Electronically signed by: Jack Gomez, 8/1/2019 2:29 PM    Twelve-lead EKG shows a normal sinus rhythm with a ventricular to 92, normal QRS, normal intervals, no ST segment elevation or depression, normal T waves.

## 2019-08-02 ENCOUNTER — APPOINTMENT (OUTPATIENT)
Dept: CARDIOLOGY | Facility: MEDICAL CENTER | Age: 74
DRG: 078 | End: 2019-08-02
Attending: HOSPITALIST
Payer: MEDICARE

## 2019-08-02 PROBLEM — I67.4 HYPERTENSIVE ENCEPHALOPATHY: Status: ACTIVE | Noted: 2019-08-02

## 2019-08-02 LAB
ANION GAP SERPL CALC-SCNC: 8 MMOL/L (ref 0–11.9)
BUN SERPL-MCNC: 13 MG/DL (ref 8–22)
CALCIUM SERPL-MCNC: 9.1 MG/DL (ref 8.5–10.5)
CHLORIDE SERPL-SCNC: 100 MMOL/L (ref 96–112)
CHOLEST SERPL-MCNC: 162 MG/DL (ref 100–199)
CO2 SERPL-SCNC: 24 MMOL/L (ref 20–33)
CREAT SERPL-MCNC: 1.2 MG/DL (ref 0.5–1.4)
ERYTHROCYTE [DISTWIDTH] IN BLOOD BY AUTOMATED COUNT: 48.5 FL (ref 35.9–50)
EST. AVERAGE GLUCOSE BLD GHB EST-MCNC: 94 MG/DL
GLUCOSE SERPL-MCNC: 89 MG/DL (ref 65–99)
HBA1C MFR BLD: 4.9 % (ref 0–5.6)
HCT VFR BLD AUTO: 35.6 % (ref 42–52)
HDLC SERPL-MCNC: 91 MG/DL
HGB BLD-MCNC: 12.4 G/DL (ref 14–18)
LDLC SERPL CALC-MCNC: 61 MG/DL
LV EJECT FRACT  99904: 60
LV EJECT FRACT MOD 2C 99903: 48.06
LV EJECT FRACT MOD 4C 99902: 59.27
LV EJECT FRACT MOD BP 99901: 56.88
MAGNESIUM SERPL-MCNC: 1.9 MG/DL (ref 1.5–2.5)
MCH RBC QN AUTO: 34.2 PG (ref 27–33)
MCHC RBC AUTO-ENTMCNC: 34.8 G/DL (ref 33.7–35.3)
MCV RBC AUTO: 98.1 FL (ref 81.4–97.8)
PHOSPHATE SERPL-MCNC: 3.3 MG/DL (ref 2.5–4.5)
PLATELET # BLD AUTO: 157 K/UL (ref 164–446)
PMV BLD AUTO: 9.6 FL (ref 9–12.9)
POTASSIUM SERPL-SCNC: 3.8 MMOL/L (ref 3.6–5.5)
RBC # BLD AUTO: 3.63 M/UL (ref 4.7–6.1)
SODIUM SERPL-SCNC: 132 MMOL/L (ref 135–145)
TRIGL SERPL-MCNC: 48 MG/DL (ref 0–149)
WBC # BLD AUTO: 6 K/UL (ref 4.8–10.8)

## 2019-08-02 PROCEDURE — A9270 NON-COVERED ITEM OR SERVICE: HCPCS | Performed by: INTERNAL MEDICINE

## 2019-08-02 PROCEDURE — 85027 COMPLETE CBC AUTOMATED: CPT

## 2019-08-02 PROCEDURE — 97162 PT EVAL MOD COMPLEX 30 MIN: CPT

## 2019-08-02 PROCEDURE — A9270 NON-COVERED ITEM OR SERVICE: HCPCS | Performed by: HOSPITALIST

## 2019-08-02 PROCEDURE — 700102 HCHG RX REV CODE 250 W/ 637 OVERRIDE(OP): Performed by: HOSPITALIST

## 2019-08-02 PROCEDURE — 700102 HCHG RX REV CODE 250 W/ 637 OVERRIDE(OP): Performed by: INTERNAL MEDICINE

## 2019-08-02 PROCEDURE — 93306 TTE W/DOPPLER COMPLETE: CPT

## 2019-08-02 PROCEDURE — 770020 HCHG ROOM/CARE - TELE (206)

## 2019-08-02 PROCEDURE — 80061 LIPID PANEL: CPT

## 2019-08-02 PROCEDURE — 97165 OT EVAL LOW COMPLEX 30 MIN: CPT

## 2019-08-02 PROCEDURE — 700111 HCHG RX REV CODE 636 W/ 250 OVERRIDE (IP): Performed by: HOSPITALIST

## 2019-08-02 PROCEDURE — 99232 SBSQ HOSP IP/OBS MODERATE 35: CPT | Performed by: HOSPITALIST

## 2019-08-02 PROCEDURE — 83735 ASSAY OF MAGNESIUM: CPT

## 2019-08-02 PROCEDURE — 93306 TTE W/DOPPLER COMPLETE: CPT | Mod: 26 | Performed by: INTERNAL MEDICINE

## 2019-08-02 PROCEDURE — 92610 EVALUATE SWALLOWING FUNCTION: CPT

## 2019-08-02 PROCEDURE — 700117 HCHG RX CONTRAST REV CODE 255: Performed by: HOSPITALIST

## 2019-08-02 PROCEDURE — 36415 COLL VENOUS BLD VENIPUNCTURE: CPT

## 2019-08-02 PROCEDURE — 80048 BASIC METABOLIC PNL TOTAL CA: CPT

## 2019-08-02 PROCEDURE — 84100 ASSAY OF PHOSPHORUS: CPT

## 2019-08-02 RX ORDER — LISINOPRIL 20 MG/1
20 TABLET ORAL TWICE DAILY
Status: DISCONTINUED | OUTPATIENT
Start: 2019-08-02 | End: 2019-08-05 | Stop reason: HOSPADM

## 2019-08-02 RX ORDER — OMEPRAZOLE 20 MG/1
20 CAPSULE, DELAYED RELEASE ORAL 2 TIMES DAILY
Status: DISCONTINUED | OUTPATIENT
Start: 2019-08-02 | End: 2019-08-05 | Stop reason: HOSPADM

## 2019-08-02 RX ORDER — AMLODIPINE BESYLATE 5 MG/1
5 TABLET ORAL
Status: DISCONTINUED | OUTPATIENT
Start: 2019-08-02 | End: 2019-08-04

## 2019-08-02 RX ADMIN — ENOXAPARIN SODIUM 40 MG: 100 INJECTION SUBCUTANEOUS at 00:06

## 2019-08-02 RX ADMIN — HUMAN ALBUMIN MICROSPHERES AND PERFLUTREN 3 ML: 10; .22 INJECTION, SOLUTION INTRAVENOUS at 16:30

## 2019-08-02 RX ADMIN — ENOXAPARIN SODIUM 40 MG: 100 INJECTION SUBCUTANEOUS at 16:56

## 2019-08-02 RX ADMIN — LISINOPRIL 20 MG: 20 TABLET ORAL at 07:58

## 2019-08-02 RX ADMIN — SENNOSIDES, DOCUSATE SODIUM 2 TABLET: 50; 8.6 TABLET, FILM COATED ORAL at 16:56

## 2019-08-02 RX ADMIN — LISINOPRIL 20 MG: 20 TABLET ORAL at 16:56

## 2019-08-02 RX ADMIN — CLOPIDOGREL BISULFATE 75 MG: 75 TABLET ORAL at 16:56

## 2019-08-02 RX ADMIN — OMEPRAZOLE 20 MG: 20 CAPSULE, DELAYED RELEASE ORAL at 22:31

## 2019-08-02 RX ADMIN — AMLODIPINE BESYLATE 5 MG: 5 TABLET ORAL at 07:58

## 2019-08-02 RX ADMIN — ASPIRIN 300 MG: 300 SUPPOSITORY RECTAL at 00:05

## 2019-08-02 ASSESSMENT — LIFESTYLE VARIABLES
ORIENTATION AND CLOUDING OF SENSORIUM: DISORIENTED FOR PLACE AND / OR PERSON
AGITATION: NORMAL ACTIVITY
PAROXYSMAL SWEATS: NO SWEAT VISIBLE
TOTAL SCORE: 4
HEADACHE, FULLNESS IN HEAD: NOT PRESENT
VISUAL DISTURBANCES: NOT PRESENT
NAUSEA AND VOMITING: NO NAUSEA AND NO VOMITING
AUDITORY DISTURBANCES: NOT PRESENT
VISUAL DISTURBANCES: NOT PRESENT
TOTAL SCORE: 4
TREMOR: NO TREMOR
AGITATION: NORMAL ACTIVITY
AUDITORY DISTURBANCES: NOT PRESENT
HEADACHE, FULLNESS IN HEAD: NOT PRESENT
AUDITORY DISTURBANCES: NOT PRESENT
VISUAL DISTURBANCES: NOT PRESENT
AUDITORY DISTURBANCES: NOT PRESENT
HEADACHE, FULLNESS IN HEAD: NOT PRESENT
ORIENTATION AND CLOUDING OF SENSORIUM: DISORIENTED FOR PLACE AND / OR PERSON
PAROXYSMAL SWEATS: NO SWEAT VISIBLE
VISUAL DISTURBANCES: NOT PRESENT
TREMOR: NO TREMOR
TOTAL SCORE: 4
ANXIETY: NO ANXIETY (AT EASE)
SUBSTANCE_ABUSE: 1
ORIENTATION AND CLOUDING OF SENSORIUM: DISORIENTED FOR PLACE AND / OR PERSON
ORIENTATION AND CLOUDING OF SENSORIUM: DISORIENTED FOR PLACE AND / OR PERSON
ANXIETY: NO ANXIETY (AT EASE)
AGITATION: NORMAL ACTIVITY
TOTAL SCORE: 4
NAUSEA AND VOMITING: NO NAUSEA AND NO VOMITING
NAUSEA AND VOMITING: NO NAUSEA AND NO VOMITING
AGITATION: NORMAL ACTIVITY
TREMOR: NO TREMOR
TREMOR: NO TREMOR
HEADACHE, FULLNESS IN HEAD: NOT PRESENT
ANXIETY: NO ANXIETY (AT EASE)
ANXIETY: NO ANXIETY (AT EASE)
NAUSEA AND VOMITING: NO NAUSEA AND NO VOMITING
PAROXYSMAL SWEATS: NO SWEAT VISIBLE
PAROXYSMAL SWEATS: NO SWEAT VISIBLE

## 2019-08-02 ASSESSMENT — COGNITIVE AND FUNCTIONAL STATUS - GENERAL
WALKING IN HOSPITAL ROOM: A LITTLE
DRESSING REGULAR UPPER BODY CLOTHING: A LITTLE
DRESSING REGULAR LOWER BODY CLOTHING: A LITTLE
MOBILITY SCORE: 18
MOVING FROM LYING ON BACK TO SITTING ON SIDE OF FLAT BED: A LITTLE
STANDING UP FROM CHAIR USING ARMS: A LITTLE
CLIMB 3 TO 5 STEPS WITH RAILING: A LITTLE
SUGGESTED CMS G CODE MODIFIER DAILY ACTIVITY: CK
PERSONAL GROOMING: A LITTLE
MOVING TO AND FROM BED TO CHAIR: A LITTLE
TURNING FROM BACK TO SIDE WHILE IN FLAT BAD: A LITTLE
CLIMB 3 TO 5 STEPS WITH RAILING: A LOT
STANDING UP FROM CHAIR USING ARMS: A LITTLE
HELP NEEDED FOR BATHING: A LITTLE
MOVING TO AND FROM BED TO CHAIR: A LITTLE
SUGGESTED CMS G CODE MODIFIER MOBILITY: CK
DAILY ACTIVITIY SCORE: 18
TOILETING: A LITTLE
TOILETING: A LITTLE
DAILY ACTIVITIY SCORE: 19
SUGGESTED CMS G CODE MODIFIER DAILY ACTIVITY: CK
MOVING FROM LYING ON BACK TO SITTING ON SIDE OF FLAT BED: A LITTLE
HELP NEEDED FOR BATHING: A LITTLE
MOBILITY SCORE: 17
SUGGESTED CMS G CODE MODIFIER MOBILITY: CK
DRESSING REGULAR LOWER BODY CLOTHING: A LITTLE
DRESSING REGULAR UPPER BODY CLOTHING: A LITTLE
EATING MEALS: A LITTLE
TURNING FROM BACK TO SIDE WHILE IN FLAT BAD: A LITTLE
PERSONAL GROOMING: A LITTLE
WALKING IN HOSPITAL ROOM: A LITTLE

## 2019-08-02 ASSESSMENT — GAIT ASSESSMENTS
GAIT LEVEL OF ASSIST: MINIMAL ASSIST
ASSISTIVE DEVICE: FRONT WHEEL WALKER
DISTANCE (FEET): 50

## 2019-08-02 ASSESSMENT — ENCOUNTER SYMPTOMS
GASTROINTESTINAL NEGATIVE: 1
CARDIOVASCULAR NEGATIVE: 1
MUSCULOSKELETAL NEGATIVE: 1
FOCAL WEAKNESS: 0
RESPIRATORY NEGATIVE: 1

## 2019-08-02 NOTE — CARE PLAN
Problem: Communication  Goal: The ability to communicate needs accurately and effectively will improve  Outcome: PROGRESSING AS EXPECTED  Note:   Non slip socks on, call light in reach, bed alarm on. Will continue to monitor.     Problem: Knowledge Deficit:  Goal: Knowledge of disease process/condition, treatment plan, diagnostic tests, and medications will improve  Outcome: PROGRESSING SLOWER THAN EXPECTED  Note:   Continue to re-educate on plan and test.

## 2019-08-02 NOTE — ASSESSMENT & PLAN NOTE
Family states the patient drinks a case (12 beers) of [employer] beer a day  Watch for withdrawals      Thiamine daily

## 2019-08-02 NOTE — ASSESSMENT & PLAN NOTE
home medications titrated    norvasc  10mg po daily    Lisinopril 20 bid      Added clonidine 0.1 bid on 8/4

## 2019-08-02 NOTE — PROGRESS NOTES
Assumed care of pt at approximately 0130. Pt A&Ox2, reporting no pain. Pt incontinent of urine requiring bed linen change. Pt updated on plan of care and reporting no other needs at this time. Bed locked and in lowest position, bed alarm on. Non skid socks in place, call light and belongings within reach. Hourly rounding in place.

## 2019-08-02 NOTE — ED NOTES
Received report from Nelda DIAZ, [facility] care, pt on monitor, call light in reach.

## 2019-08-02 NOTE — THERAPY
"Pt is a 73 y/o male admitted with acute confusion and word finding difficulties. Pt oriented only to self upon PT arrival. Demonstrated impairments in functional mobility, balance, gait, strength, activity tolerance and safety awareness. Pt will benefit from acute PT interventions to address present impairments. Need to obtain hx of home environment and PLOF from family as pt is a poor historian. However, based on current mobility, recommend post acute placement prior to DC home.     Physical Therapy Evaluation completed.   Bed Mobility:  Supine to Sit: Supervised  Transfers: Sit to Stand: Supervised  Gait: Level Of Assist: Minimal Assist with Front-Wheel Walker       Plan of Care: Will benefit from Physical Therapy 3 times per week  Discharge Recommendations: Equipment: Will Continue to Assess for Equipment Needs.   Post-acute therapy: based on current mobility, recommend post acute placement prior to DC home.     See \"Rehab Therapy-Acute\" Patient Summary Report for complete documentation.     "

## 2019-08-02 NOTE — PROGRESS NOTES
PT AOX1, oriented to self. Speech saw pt and PT wasput on a Full Liquid, nectar thick diet with 1:1 feed supervision for reminders to swallow. Pt still having some word finding issues. No needs at this time.

## 2019-08-02 NOTE — PROGRESS NOTES
2 RN skin check:    Performed with: JOE Platt    Sacrum, elbows, heels, occiput, ears, and shoulders intact and blanching. Hardened nodule with black head noted to L hip. Bilateral LE mottled. Bruising noted to bilateral upper extremities. Education provided on activity and mobilization encouraged.

## 2019-08-02 NOTE — THERAPY
"Occupational Therapy Evaluation completed.   Functional Status: Supine > EOB supv, transfers with FWW min A, standing grooming min A to sequence, LB dressing min A  Plan of Care: Will benefit from Occupational Therapy 3 times per week  Discharge Recommendations:  Equipment: Will Continue to Assess for Equipment Needs. Post-acute therapy: See below     See \"Rehab Therapy-Acute\" Patient Summary Report for complete documentation.    74 y.o. male with h/o HTN, TIA, heavy ETOH use, who presented with confusion, gait disturbance, word finding difficulty. MRI negative for acute infarct. Pt dx with acute encephalopathy. Seen now for OT eval. Pt received in bed having been incontinent of urine. He is oriented to self only, but is pleasant, cooperative, follows 1-step commands. Balance is grossly intact, however some sequencing difficulty with mobility and ADL. Pt is limited primarily by cognitive deficits which negatively impact ADL and safe functional mobility. Would benefit from acute OT to maximize functional independence and safety, progress standing ADL, assist with DC planning and DME recs. Will follow.     "

## 2019-08-02 NOTE — PROGRESS NOTES
Monitor summary: SR 65-74, MO 0.20, QRS 0.08, QT 0.38, with rare PVCs per strip from monitor room.

## 2019-08-02 NOTE — ED NOTES
Pt to S176-01 with transport, pt on tele monitor, pt in possession of all belongings, report has been called.

## 2019-08-02 NOTE — PROGRESS NOTES
[facility] Medicine Daily Progress Note    Date of Service  8/2/2019    Chief Complaint  74 y.o. male admitted 8/1/2019 with confusion      Interval Problem Update  bp was markedly elevated on admit and still elevated    Mri brain negative for infarct      Unsteady gait    Intermittent confusion    Lipid panel wnl    Drinks alcohol daily    Consultants/Specialty  Dr smith neuro  Code Status  full    Disposition  pending    Review of Systems  Review of Systems   Constitutional: Positive for malaise/fatigue.   HENT: Negative.    Respiratory: Negative.    Cardiovascular: Negative.    Gastrointestinal: Negative.    Genitourinary: Negative.    Musculoskeletal: Negative.    Neurological: Negative for focal weakness.   Psychiatric/Behavioral: Positive for substance abuse.   All other systems reviewed and are negative.       Physical Exam  Temp:  [36.4 °C (97.6 °F)-36.8 °C (98.3 °F)] 36.7 °C (98 °F)  Pulse:  [65-85] 69  Resp:  [12-17] 16  BP: (122-181)/() 171/84  SpO2:  [92 %-99 %] 96 %    Physical Exam   Constitutional: No distress.   HENT:   Head: Normocephalic and atraumatic.   Eyes: Pupils are equal, round, and reactive to light. Left eye exhibits no discharge.   Neck: No thyromegaly present.   Cardiovascular: Normal rate, regular rhythm and intact distal pulses. Exam reveals no friction rub.   No murmur heard.  Pulmonary/Chest: Effort normal and breath sounds normal.   Abdominal: Soft. He exhibits distension.   Neurological: He is alert.   Confused    ataxia   Skin: Skin is warm. He is not diaphoretic.   Psychiatric: He has a normal mood and affect.       Fluids  No intake or output data in the 24 hours ending 08/02/19 1434    Laboratory  Recent Labs     08/01/19  1125 08/01/19  1400 08/02/19  0345   WBC 7.2 6.8 6.0   RBC 4.23* 4.14* 3.63*   HEMOGLOBIN 14.2 14.1 12.4*   HEMATOCRIT 40.1* 40.4* 35.6*   MCV 94.8* 97.6 98.1*   MCH 33.6* 34.1* 34.2*   MCHC 35.4 34.9 34.8   RDW 13.2 48.0 48.5   PLATELETCT 174 150* 157*    MPV 9.3 9.3 9.6     Recent Labs     08/01/19  1125 08/01/19  1400 08/02/19  0345   SODIUM 130* 129* 132*   POTASSIUM 4.2 4.2 3.8   CHLORIDE 97* 96 100   CO2 19* 23 24   GLUCOSE 103* 102* 89   BUN 12 13 13   CREATININE 1.3 1.26 1.20   CALCIUM 9.3 9.6 9.1     Recent Labs     08/01/19  1400   APTT 28.0   INR 1.02         Recent Labs     08/02/19  0345   TRIGLYCERIDE 48   HDL 91   LDL 61       Imaging  EC-ECHOCARDIOGRAM COMPLETE W/ CONT   Final Result      MR-BRAIN-W/O   Final Result      1.  Study is degraded by patient motion.   2.  No acute large major vascular territory infarct or hemorrhage is seen.   3.  Generalized atrophy. There is ventriculomegaly which appears out of proportion to the degree of sulcal prominence. Cannot exclude underlying communicating hydrocephalus, NPH.   4.  Periventricular T2 hyperintensities are nonspecific, most likely chronic microvascular ischemic changes.      DX-CHEST-PORTABLE (1 VIEW)   Final Result      Borderline cardiomegaly.      CT-CTA HEAD WITH & W/O-POST PROCESS   Final Result      CT angiogram of the [place] within normal limits.   Cerebral atrophy and central microvascular ischemic changes.   No acute intracranial hemorrhage.      CT-CTA NECK WITH & W/O-POST PROCESSING   Final Result      1.  Atherosclerotic plaque at the carotid bifurcations bilaterally with extension into the internal carotid arteries. This results in less than 50% diameter narrowing.      2.  Scattered atherosclerotic plaque involving the vertebral arteries. No evidence of occlusion or dissection.      CT-CEREBRAL PERFUSION ANALYSIS   Final Result      1.  Cerebral blood flow less than 30% likely representing completed infarct = 0 mL.      2.  T Max more than 6 seconds likely representing combination of completed infarct and ischemia = 115 mL.      3.  Mismatched volume likely representing ischemic brain/penumbra = 115 mL      4.  Please note that the cerebral perfusion was performed on the  limited brain tissue around the basal ganglia region. Infarct/ischemia outside the CT perfusion sections can be missed in this study.      OUTSIDE IMAGES-CT HEAD   Final Result           Assessment/Plan  * Hypertensive encephalopathy- (present on admission)  Assessment & Plan  Increased lisinopril and norvasc dosage on 8/1    Will titrate    Overweight (BMI 25.0-29.9)  Assessment & Plan  Body mass index is 29.84 kg/m².    Weight loss and exercise recommended    Hyponatremia- (present on admission)  Assessment & Plan  monitor      Alcohol abuse- (present on admission)  Assessment & Plan  Family states the patient drinks a case (12 beers) of Budweiser beer a day  Watch for withdrawals      Thiamine daily    Hypertensive urgency  Assessment & Plan  Check thyroid   home medications titrated      Acute left-sided weakness- (present on admission)  Assessment & Plan  Resolved    Doubt stroke..likely due to elevated BP        Dysarthria- (present on admission)  Assessment & Plan  Speech evaluation        VTE prophylaxis: scd

## 2019-08-02 NOTE — CARE PLAN
Pt exhibiting word finding difficulty. Dysphagia screen failed, SLP order placed. NPO status maintained. Lap belt in place preventatively.

## 2019-08-02 NOTE — THERAPY
"  Speech Language Therapy Clinical Swallow Evaluation completed.  Functional Status: Patient was seen for a clinical bedside swallow evaluation this date. Patient was upright, confused and oriented to self only. Patient was able to follow simple one step directives. Patient consumed PO trials of ice, thins, nectars, and purees. Patient demonstrated difficulties with self feeding, delayed onset of swallow with oral holding 8-10 seconds prior to onset of swallow with weak and delayed laryngeal elevation and hyolaryngeal excursion noted upon palpation. Patient demonstrated immediate and delayed throat clearing and delayed coughing s/p trials of thin liquids. Patient required cues to alternate between NTL and purees, would often say the purees did not go down, however clear voice and no residue noted in oral cavity. Patient demonstrated no clinical s/sx of aspiration during PO trials of NTL and purees. At this time patients confusion is interfering with PO intake and requires direct 1:1A with strict strategies. Recommend a NTFL diet with 1:1A, high f/u, strict precautions, HOLD with any increased confusion or changes. SLP to follow for dysphagia tx.   Recommendations - Diet: Diet / Liquid Recommendation: Nectar Thick Full Liquid                          Strategies: Strict 1:1 feeding, no straws, monitor for all meals, HOLD with any increased confusion or s/sx of aspiration                           Medication Administration: Medication Administration : Crush all Medications in Puree  Plan of Care: Will benefit from Speech Therapy 3 times per week  Post-Acute Therapy: Discharge to a transitional care facility for continued skilled therapy services.    See \"Rehab Therapy-Acute\" Patient Summary Report for complete documentation.   "

## 2019-08-02 NOTE — H&P
Hospital Medicine History & Physical Note    Date of Service  8/1/2019    Primary Care Physician  Pcp Not In Computer    Consultants  Neurology    Code Status  DNR/DNI    Chief Complaint  acute confusion with word finding difficulties at 900 this morning    History of Presenting Illness  74 y.o. overweight male with hypertension, dyslipidemia, prior TIAs, and a habit of drinking 12 beers a day who presented 8/1/2019 with last being seen with normal speech and function at approximately 9 AM in the morning.  Reportedly he had gone to a convenient store and [place] and passer [place] noted that he was having confusion and difficulty getting out of his car.  He is having some word finding difficulties and paramedics were called.  He was taken to [facility] and transferred to [facility] for emergent neurological intervention.  Here CTA of the head and neck did not show any embolic phenomenon.  Neurology has consulted and did not feel that he was TPA candidate.  The patient is having word finding difficulties currently.  Neurology felt that patient was having hypertensive encephalopathy.  Initial systolic blood pressures reportedly when the 200s in triage per nursing.  And up in [facility] no reports of systolics in the 230s.  The patient and his wife and daughter who are at bedside deny any knowledge of any recent falls or head trauma.    Review of Systems  Review of Systems   Constitutional: Negative for malaise/fatigue.   HENT: Negative for sore throat.    Eyes: Negative for double vision.   Respiratory: Negative for cough and shortness of breath.    Cardiovascular: Negative for chest pain, palpitations and leg swelling.   Gastrointestinal: Negative for abdominal pain and nausea.   Genitourinary: Negative for dysuria and urgency.   Musculoskeletal: Negative for myalgias and neck pain.   Neurological: Negative for dizziness, tremors, sensory change, focal weakness and headaches.   Psychiatric/Behavioral:  Positive for substance abuse (12 beers daily). Negative for depression.       Past Medical History   has a past medical history of Hyperlipidemia, Hypertension, and TIA (transient ischemic attack).    Surgical History  Tonsillectomy, bilateral knee surgery    Family History  Alcoholism    Social History   reports that he quit smoking about 20 years ago. He has never used smokeless tobacco. He reports that he drinks alcohol. He reports that he does not use drugs.   children.  Lives in [facility] and is visiting here in [facility]    Allergies  No Known Allergies    Medications  Prior to Admission Medications   Prescriptions Last Dose Informant Patient Reported? Taking?   amLODIPine (NORVASC) 2.5 MG Tab 1 WEEK AGO at [facility]  Patient Yes No   Sig: Take 2.5 mg by mouth every day.   atorvastatin (LIPITOR) 40 MG Tab 1 WEEK AGO at [facility] Patient Yes No   Sig: Take 40 mg by mouth every day.   clopidogrel (PLAVIX) 75 MG Tab 1 WEEK AGO at [facility] Patient Yes No   Sig: Take 75 mg by mouth every day.   lisinopril (PRINIVIL) 10 MG Tab 1 WEEK AGO at [facility] Patient Yes No   Sig: Take 10 mg by mouth 2 times a day.      Facility-Administered Medications: None       Physical Exam  Temp:  [36.9 °C (98.4 °F)] 36.9 °C (98.4 °F)  Pulse:  [76-98] 82  Resp:  [16-31] 16  BP: (122-208)/() 164/102  SpO2:  [92 %-99 %] 98 %    Physical Exam   Constitutional: No distress.   Overweight   HENT:   Head: Normocephalic and atraumatic.   Nose: Nose normal.   Mouth/Throat: No oropharyngeal exudate.   Eyes: Conjunctivae and EOM are normal. Right eye exhibits no discharge. Left eye exhibits no discharge. No scleral icterus.   Neck: No tracheal deviation present.   Cardiovascular: Normal rate, regular rhythm and normal heart sounds.   No murmur heard.  Pulses:       Radial pulses are 2+ on the right side, and 2+ on the left side.        Dorsalis pedis pulses are 2+ on the right side, and 2+ on the left side.   Pulmonary/Chest: Effort  normal and breath sounds normal. No stridor. No respiratory distress. He has no wheezes. He has no rales.   Abdominal: Soft. Bowel sounds are normal. He exhibits no distension. There is no tenderness. There is no rebound.   Musculoskeletal: He exhibits no edema.   Neurological: He is alert. A cranial nerve deficit is present. Coordination normal.   Skin: Skin is warm. He is not diaphoretic.   Psychiatric: He has a normal mood and affect. His behavior is normal. Cognition and memory are impaired.   Vitals reviewed.      Laboratory:  Recent Labs     08/01/19  1125 08/01/19  1400   WBC 7.2 6.8   RBC 4.23* 4.14*   HEMOGLOBIN 14.2 14.1   HEMATOCRIT 40.1* 40.4*   MCV 94.8* 97.6   MCH 33.6* 34.1*   MCHC 35.4 34.9   RDW 13.2 48.0   PLATELETCT 174 150*   MPV 9.3 9.3     Recent Labs     08/01/19  1125 08/01/19  1400   SODIUM 130* 129*   POTASSIUM 4.2 4.2   CHLORIDE 97* 96   CO2 19* 23   GLUCOSE 103* 102*   BUN 12 13   CREATININE 1.3 1.26   CALCIUM 9.3 9.6     Recent Labs     08/01/19  1125 08/01/19  1400   ALTSGPT 20 13   ASTSGOT 37 29   ALKPHOSPHAT 118 104*   TBILIRUBIN 1.2 1.2   GLUCOSE 103* 102*     Recent Labs     08/01/19  1400   APTT 28.0   INR 1.02     No results for input(s): NTPROBNP in the last 72 hours.      Recent Labs     08/01/19  1400   TROPONINT 18       Urinalysis:    Recent Labs     08/01/19  1533   SPECGRAVITY 1.010   GLUCOSEUR Negative   KETONES Negative   NITRITE Negative   LEUKESTERAS Trace*   WBCURINE 0-2*   RBCURINE 0-2*   BACTERIA Negative   EPITHELCELL Negative        Imaging:  DX-CHEST-PORTABLE (1 VIEW)   Final Result      Borderline cardiomegaly.      CT-CTA HEAD WITH & W/O-POST PROCESS   Final Result      CT angiogram of the [place] within normal limits.   Cerebral atrophy and central microvascular ischemic changes.   No acute intracranial hemorrhage.      CT-CTA NECK WITH & W/O-POST PROCESSING   Final Result      1.  Atherosclerotic plaque at the carotid bifurcations bilaterally with  extension into the internal carotid arteries. This results in less than 50% diameter narrowing.      2.  Scattered atherosclerotic plaque involving the vertebral arteries. No evidence of occlusion or dissection.      CT-CEREBRAL PERFUSION ANALYSIS   Final Result      1.  Cerebral blood flow less than 30% likely representing completed infarct = 0 mL.      2.  T Max more than 6 seconds likely representing combination of completed infarct and ischemia = 115 mL.      3.  Mismatched volume likely representing ischemic brain/penumbra = 115 mL      4.  Please note that the cerebral perfusion was performed on the limited brain tissue around the basal ganglia region. Infarct/ischemia outside the CT perfusion sections can be missed in this study.      OUTSIDE IMAGES-CT HEAD   Final Result      MR-BRAIN-W/O    (Results Pending)   EC-ECHOCARDIOGRAM COMPLETE W/O CONT    (Results Pending)         Assessment/Plan:  I anticipate this patient will require at least two midnights for appropriate medical management, necessitating inpatient admission.    Overweight (BMI 25.0-29.9)  Assessment & Plan  Body mass index is 29.84 kg/m².  Discussed cessation of alcohol which would help him tremendously with the weight loss.  Needs ongoing exercise and healthy balanced diet    Hyponatremia  Assessment & Plan  Overweight gentleman and I am concerned of possible beer podomania as he does drink 12 beers a day.  Fluid restrict  Monitor BMP      Alcohol abuse  Assessment & Plan  Family states the patient drinks a case (12 beers) of Budweiser beer a day  Watch for withdrawals  Rally bag today then oral thiamine and folate after speech has evaluated for swallowing safely  Alcohol withdrawal protocol initiated  I have counseled him on cessation of alcohol he will need ongoing encouragement    Hypertensive urgency  Assessment & Plan  Check thyroid  Continue home medications monitoring vitals      Acute left-sided weakness  Assessment & Plan  With  resolve.  Neurology consulting.  MRI brain preliminary report without any acute findings per my evaluation.  Await official report.  MRI did show some mildly enlarged ventricles however he does have atrophy as well.  Neurology with concern of hypertensive urgency and is causing his acute issue.  We will monitor vitals maintaining less than 180 systolic blood pressures    Dysarthria  Assessment & Plan  Speech evaluation for swallowing  Check folate and thiamine      VTE prophylaxis: Enoxaparin

## 2019-08-02 NOTE — DISCHARGE PLANNING
PMR order received from Dr. Levi.  [employer] is shown for his medical provider.  Presented from an OSH with stroke-like sx.  W/U & TX evals are pending.  This referral will not be forwarded to Physiatry @ this time.  TCC remains monitoring.  I do appreciate the referral.

## 2019-08-03 PROCEDURE — 99232 SBSQ HOSP IP/OBS MODERATE 35: CPT | Performed by: HOSPITALIST

## 2019-08-03 PROCEDURE — 700102 HCHG RX REV CODE 250 W/ 637 OVERRIDE(OP): Performed by: HOSPITALIST

## 2019-08-03 PROCEDURE — A9270 NON-COVERED ITEM OR SERVICE: HCPCS | Performed by: INTERNAL MEDICINE

## 2019-08-03 PROCEDURE — A9270 NON-COVERED ITEM OR SERVICE: HCPCS | Performed by: HOSPITALIST

## 2019-08-03 PROCEDURE — 770020 HCHG ROOM/CARE - TELE (206)

## 2019-08-03 PROCEDURE — 92523 SPEECH SOUND LANG COMPREHEN: CPT

## 2019-08-03 PROCEDURE — 700102 HCHG RX REV CODE 250 W/ 637 OVERRIDE(OP): Performed by: INTERNAL MEDICINE

## 2019-08-03 PROCEDURE — 700111 HCHG RX REV CODE 636 W/ 250 OVERRIDE (IP): Performed by: HOSPITALIST

## 2019-08-03 RX ADMIN — ENOXAPARIN SODIUM 40 MG: 100 INJECTION SUBCUTANEOUS at 16:30

## 2019-08-03 RX ADMIN — AMLODIPINE BESYLATE 5 MG: 5 TABLET ORAL at 05:53

## 2019-08-03 RX ADMIN — ASPIRIN 325 MG: 325 TABLET, FILM COATED ORAL at 05:52

## 2019-08-03 RX ADMIN — SENNOSIDES, DOCUSATE SODIUM 2 TABLET: 50; 8.6 TABLET, FILM COATED ORAL at 16:30

## 2019-08-03 RX ADMIN — FOLIC ACID 1 MG: 1 TABLET ORAL at 05:52

## 2019-08-03 RX ADMIN — LISINOPRIL 20 MG: 20 TABLET ORAL at 05:53

## 2019-08-03 RX ADMIN — OMEPRAZOLE 20 MG: 20 CAPSULE, DELAYED RELEASE ORAL at 05:52

## 2019-08-03 RX ADMIN — OMEPRAZOLE 20 MG: 20 CAPSULE, DELAYED RELEASE ORAL at 16:30

## 2019-08-03 RX ADMIN — LISINOPRIL 20 MG: 20 TABLET ORAL at 16:30

## 2019-08-03 RX ADMIN — CLOPIDOGREL BISULFATE 75 MG: 75 TABLET ORAL at 16:30

## 2019-08-03 RX ADMIN — Medication 100 MG: at 05:53

## 2019-08-03 RX ADMIN — THERA TABS 1 TABLET: TAB at 05:53

## 2019-08-03 RX ADMIN — SENNOSIDES, DOCUSATE SODIUM 2 TABLET: 50; 8.6 TABLET, FILM COATED ORAL at 05:52

## 2019-08-03 ASSESSMENT — LIFESTYLE VARIABLES
AGITATION: NORMAL ACTIVITY
ORIENTATION AND CLOUDING OF SENSORIUM: DISORIENTED FOR PLACE AND / OR PERSON
TREMOR: NO TREMOR
TREMOR: NO TREMOR
ORIENTATION AND CLOUDING OF SENSORIUM: DISORIENTED FOR PLACE AND / OR PERSON
ANXIETY: NO ANXIETY (AT EASE)
AGITATION: NORMAL ACTIVITY
VISUAL DISTURBANCES: NOT PRESENT
VISUAL DISTURBANCES: NOT PRESENT
SUBSTANCE_ABUSE: 1
TOTAL SCORE: 4
TREMOR: NO TREMOR
PAROXYSMAL SWEATS: NO SWEAT VISIBLE
AGITATION: SOMEWHAT MORE THAN NORMAL ACTIVITY
AUDITORY DISTURBANCES: NOT PRESENT
PAROXYSMAL SWEATS: NO SWEAT VISIBLE
HEADACHE, FULLNESS IN HEAD: NOT PRESENT
ANXIETY: NO ANXIETY (AT EASE)
AGITATION: NORMAL ACTIVITY
ORIENTATION AND CLOUDING OF SENSORIUM: DISORIENTED FOR PLACE AND / OR PERSON
HEADACHE, FULLNESS IN HEAD: NOT PRESENT
TOTAL SCORE: 4
VISUAL DISTURBANCES: NOT PRESENT
TREMOR: NO TREMOR
TOTAL SCORE: 6
HEADACHE, FULLNESS IN HEAD: NOT PRESENT
AUDITORY DISTURBANCES: NOT PRESENT
VISUAL DISTURBANCES: NOT PRESENT
AUDITORY DISTURBANCES: NOT PRESENT
ANXIETY: NO ANXIETY (AT EASE)
AUDITORY DISTURBANCES: NOT PRESENT
NAUSEA AND VOMITING: NO NAUSEA AND NO VOMITING
TOTAL SCORE: 4
TREMOR: NO TREMOR
AUDITORY DISTURBANCES: NOT PRESENT
PAROXYSMAL SWEATS: NO SWEAT VISIBLE
ANXIETY: MILDLY ANXIOUS
AUDITORY DISTURBANCES: NOT PRESENT
AGITATION: NORMAL ACTIVITY
ANXIETY: NO ANXIETY (AT EASE)
PAROXYSMAL SWEATS: NO SWEAT VISIBLE
ORIENTATION AND CLOUDING OF SENSORIUM: DISORIENTED FOR PLACE AND / OR PERSON
PAROXYSMAL SWEATS: NO SWEAT VISIBLE
NAUSEA AND VOMITING: NO NAUSEA AND NO VOMITING
VISUAL DISTURBANCES: NOT PRESENT
TOTAL SCORE: 4
NAUSEA AND VOMITING: NO NAUSEA AND NO VOMITING
HEADACHE, FULLNESS IN HEAD: NOT PRESENT
ANXIETY: NO ANXIETY (AT EASE)
HEADACHE, FULLNESS IN HEAD: NOT PRESENT
NAUSEA AND VOMITING: NO NAUSEA AND NO VOMITING
TOTAL SCORE: 4
NAUSEA AND VOMITING: NO NAUSEA AND NO VOMITING
VISUAL DISTURBANCES: NOT PRESENT
HEADACHE, FULLNESS IN HEAD: NOT PRESENT
PAROXYSMAL SWEATS: NO SWEAT VISIBLE
TREMOR: NO TREMOR
NAUSEA AND VOMITING: NO NAUSEA AND NO VOMITING
ORIENTATION AND CLOUDING OF SENSORIUM: DISORIENTED FOR PLACE AND / OR PERSON
ORIENTATION AND CLOUDING OF SENSORIUM: DISORIENTED FOR PLACE AND / OR PERSON
AGITATION: NORMAL ACTIVITY

## 2019-08-03 ASSESSMENT — PATIENT HEALTH QUESTIONNAIRE - PHQ9
SUM OF ALL RESPONSES TO PHQ9 QUESTIONS 1 AND 2: 0
SUM OF ALL RESPONSES TO PHQ9 QUESTIONS 1 AND 2: 0
2. FEELING DOWN, DEPRESSED, IRRITABLE, OR HOPELESS: NOT AT ALL
1. LITTLE INTEREST OR PLEASURE IN DOING THINGS: NOT AT ALL
2. FEELING DOWN, DEPRESSED, IRRITABLE, OR HOPELESS: NOT AT ALL
1. LITTLE INTEREST OR PLEASURE IN DOING THINGS: NOT AT ALL

## 2019-08-03 ASSESSMENT — ENCOUNTER SYMPTOMS
FOCAL WEAKNESS: 0
GASTROINTESTINAL NEGATIVE: 1
MUSCULOSKELETAL NEGATIVE: 1
CARDIOVASCULAR NEGATIVE: 1
RESPIRATORY NEGATIVE: 1

## 2019-08-03 NOTE — PROGRESS NOTES
Monitor summary: SR 65-81, IN 0.18, QRS 0.10, QT 0.40, with rare PVCs per strip from monitor room.

## 2019-08-03 NOTE — CARE PLAN
Problem: Safety  Goal: Will remain free from injury  Outcome: PROGRESSING SLOWER THAN EXPECTED     Problem: Safety:  Goal: Will remain free from injury  Outcome: PROGRESSING SLOWER THAN EXPECTED     Problem: Urinary:  Goal: Ability to maintain continence will improve  Outcome: PROGRESSING SLOWER THAN EXPECTED

## 2019-08-03 NOTE — PROGRESS NOTES
Monitor summary: SR 62-84, AR 0.20, QRS 0.10, QT 0.40, with rare PVCs per strip from monitor room.

## 2019-08-03 NOTE — DISCHARGE PLANNING
F/U for Rehab. Chart notes reflect [employer] insurance which is not a provider for [facility]. This will not be forwarded for Physiatry consult per protocol. Anticipate [place] input for post acute care options. Thank you for the referral.

## 2019-08-03 NOTE — THERAPY
"Speech Language Therapy Evaluation completed to address speech, communication and cognition    Functional Status: Patient was seen on this date for a cognitive-linguistic evaluation. Patient AAO to self and month only. Patient stated he lives in [facility] but was unable to specify exact location. Speech mild-moderately dysarthric characterized by cluster reduction and imprecise articulation. Portions of standardized measures were administered to assess various cognitive linguistic domains. Severe deficits in attention to task, immediate recall of story grammar elements (0/25), verbal STM (0/3 words recalled w/ 5 min delay, 0/3 given categorical cue, and 1/3 given an option of 3), generative naming (5 items in 1 minute), simple verbal sequencing, and clock drawing. Limited initiation to task and required min-moderate prompting during session. Patient's SO and daughter arrived during session and patient became increasingly frustrated. Family members kindly stepped out to resume evaluation. However, frustration persisted and patient threw pen on table and stated, \"I can't. I'm done.\" Education provided to SO and daughter regarding current status and SLP recs.     Recommendations: At this time, patient does not appear safe for independent living and would recommend 24-hour supervision upon discharge and speech therapy at the acute and post acute level of care to address deficits stated above.     Plan of Care: Will benefit from Speech Therapy 3 times per week    Post-Acute Therapy: Recommend inpatient transitional care services for continued speech therapy services.      See \"Rehab Therapy-Acute\" Patient Summary Report for complete documentation. Thank you for the consult.   "

## 2019-08-03 NOTE — PROGRESS NOTES
Hospital Medicine Daily Progress Note    Date of Service  8/3/2019    Chief Complaint  74 y.o. male admitted 8/1/2019 with confusion      Interval Problem Update  bp was markedly elevated on admit     bp is still elevated    Mri brain negative for infarct    Case d/w family at bedside. They want to go to SNF in [facility]    Unsteady gait    Memory issues      Drinks alcohol daily. Patient advised to cut back on his drinking    Consultants/Specialty  Dr smith neuro  Code Status  full    Disposition  pending    Review of Systems  Review of Systems   Constitutional: Positive for malaise/fatigue.   HENT: Negative.    Respiratory: Negative.    Cardiovascular: Negative.    Gastrointestinal: Negative.    Genitourinary: Negative.    Musculoskeletal: Negative.    Neurological: Negative for focal weakness.   Psychiatric/Behavioral: Positive for substance abuse.   All other systems reviewed and are negative.       Physical Exam  Temp:  [36.7 °C (98.1 °F)-37.3 °C (99.2 °F)] 36.7 °C (98.1 °F)  Pulse:  [66-75] 70  Resp:  [16-20] 18  BP: (153-169)/(80-94) 154/80  SpO2:  [90 %-96 %] 90 %    Physical Exam   Constitutional: No distress.   HENT:   Head: Normocephalic and atraumatic.   Eyes: Pupils are equal, round, and reactive to light. Left eye exhibits no discharge.   Neck: No thyromegaly present.   Cardiovascular: Normal rate, regular rhythm and intact distal pulses. Exam reveals no friction rub.   No murmur heard.  Pulmonary/Chest: Effort normal and breath sounds normal.   Abdominal: Soft. He exhibits distension.   Neurological: He is alert.   Oriented to self and place but not time    ataxia   Skin: Skin is warm. He is not diaphoretic.   Psychiatric: He has a normal mood and affect.       Fluids    Intake/Output Summary (Last 24 hours) at 8/3/2019 1442  Last data filed at 8/3/2019 1148  Gross per 24 hour   Intake 1240 ml   Output --   Net 1240 ml       Laboratory  Recent Labs     08/01/19  1125 08/01/19  1400 08/02/19  0345   WBC  7.2 6.8 6.0   RBC 4.23* 4.14* 3.63*   HEMOGLOBIN 14.2 14.1 12.4*   HEMATOCRIT 40.1* 40.4* 35.6*   MCV 94.8* 97.6 98.1*   MCH 33.6* 34.1* 34.2*   MCHC 35.4 34.9 34.8   RDW 13.2 48.0 48.5   PLATELETCT 174 150* 157*   MPV 9.3 9.3 9.6     Recent Labs     08/01/19  1125 08/01/19  1400 08/02/19  0345   SODIUM 130* 129* 132*   POTASSIUM 4.2 4.2 3.8   CHLORIDE 97* 96 100   CO2 19* 23 24   GLUCOSE 103* 102* 89   BUN 12 13 13   CREATININE 1.3 1.26 1.20   CALCIUM 9.3 9.6 9.1     Recent Labs     08/01/19  1400   APTT 28.0   INR 1.02         Recent Labs     08/02/19  0345   TRIGLYCERIDE 48   HDL 91   LDL 61       Imaging  EC-ECHOCARDIOGRAM COMPLETE W/ CONT   Final Result      MR-BRAIN-W/O   Final Result      1.  Study is degraded by patient motion.   2.  No acute large major vascular territory infarct or hemorrhage is seen.   3.  Generalized atrophy. There is ventriculomegaly which appears out of proportion to the degree of sulcal prominence. Cannot exclude underlying communicating hydrocephalus, NPH.   4.  Periventricular T2 hyperintensities are nonspecific, most likely chronic microvascular ischemic changes.      DX-CHEST-PORTABLE (1 VIEW)   Final Result      Borderline cardiomegaly.      CT-CTA HEAD WITH & W/O-POST PROCESS   Final Result      CT angiogram of the [place] within normal limits.   Cerebral atrophy and central microvascular ischemic changes.   No acute intracranial hemorrhage.      CT-CTA NECK WITH & W/O-POST PROCESSING   Final Result      1.  Atherosclerotic plaque at the carotid bifurcations bilaterally with extension into the internal carotid arteries. This results in less than 50% diameter narrowing.      2.  Scattered atherosclerotic plaque involving the vertebral arteries. No evidence of occlusion or dissection.      CT-CEREBRAL PERFUSION ANALYSIS   Final Result      1.  Cerebral blood flow less than 30% likely representing completed infarct = 0 mL.      2.  T Max more than 6 seconds likely  representing combination of completed infarct and ischemia = 115 mL.      3.  Mismatched volume likely representing ischemic brain/penumbra = 115 mL      4.  Please note that the cerebral perfusion was performed on the limited brain tissue around the basal ganglia region. Infarct/ischemia outside the CT perfusion sections can be missed in this study.      OUTSIDE IMAGES-CT HEAD   Final Result           Assessment/Plan  * Hypertensive encephalopathy- (present on admission)  Assessment & Plan  titrating lisinopril and norvasc     Will titrate    Overweight (BMI 25.0-29.9)  Assessment & Plan  Body mass index is 29.84 kg/m².    Weight loss and exercise recommended    Hyponatremia- (present on admission)  Assessment & Plan  monitor      Alcohol abuse- (present on admission)  Assessment & Plan  Family states the patient drinks a case (12 beers) of Budwe[employer] beer a day  Watch for withdrawals      Thiamine daily    Hypertensive urgency  Assessment & Plan     home medications titrated    norvasc increased to 10mg po daily      Acute left-sided weakness- (present on admission)  Assessment & Plan  Resolved    Doubt stroke..likely due to elevated BP        Dysarthria- (present on admission)  Assessment & Plan  Seems to be at baseline       VTE prophylaxis: scd

## 2019-08-03 NOTE — DISCHARGE PLANNING
Anticipated Discharge Disposition: SNF    Action:   This writer for weekend coverage  Received notice from Dr. Robin that pt will need to go to SNF. Talked to family who gave choice for [facility] as it is closer to their home. CM also gave them choice list for facilities in [place] as explained to family that there are a few beds at [facility] and may not have have availability. Wife will   Research the various facilities in [place] but her number one choice is [facility]. Choice for [facility] faxed to [facility].     Barriers to Discharge:   Pending acceptance by SNF    Plan:   Follow up with [facility].

## 2019-08-03 NOTE — DISCHARGE PLANNING
Received Choice form at 1923  Agency/Facility Name: [facility]  Referral sent per Choice form @ [phone]

## 2019-08-04 PROCEDURE — 700102 HCHG RX REV CODE 250 W/ 637 OVERRIDE(OP): Performed by: HOSPITALIST

## 2019-08-04 PROCEDURE — A9270 NON-COVERED ITEM OR SERVICE: HCPCS | Performed by: HOSPITALIST

## 2019-08-04 PROCEDURE — A9270 NON-COVERED ITEM OR SERVICE: HCPCS | Performed by: INTERNAL MEDICINE

## 2019-08-04 PROCEDURE — 700111 HCHG RX REV CODE 636 W/ 250 OVERRIDE (IP): Performed by: HOSPITALIST

## 2019-08-04 PROCEDURE — 99232 SBSQ HOSP IP/OBS MODERATE 35: CPT | Performed by: HOSPITALIST

## 2019-08-04 PROCEDURE — 770006 HCHG ROOM/CARE - MED/SURG/GYN SEMI*

## 2019-08-04 PROCEDURE — 700102 HCHG RX REV CODE 250 W/ 637 OVERRIDE(OP): Performed by: INTERNAL MEDICINE

## 2019-08-04 RX ORDER — AMLODIPINE BESYLATE 5 MG/1
5 TABLET ORAL ONCE
Status: COMPLETED | OUTPATIENT
Start: 2019-08-04 | End: 2019-08-04

## 2019-08-04 RX ORDER — CLONIDINE HYDROCHLORIDE 0.1 MG/1
0.1 TABLET ORAL TWICE DAILY
Status: DISCONTINUED | OUTPATIENT
Start: 2019-08-04 | End: 2019-08-05 | Stop reason: HOSPADM

## 2019-08-04 RX ORDER — AMLODIPINE BESYLATE 5 MG/1
10 TABLET ORAL
Status: DISCONTINUED | OUTPATIENT
Start: 2019-08-05 | End: 2019-08-05 | Stop reason: HOSPADM

## 2019-08-04 RX ADMIN — SENNOSIDES, DOCUSATE SODIUM 2 TABLET: 50; 8.6 TABLET, FILM COATED ORAL at 04:15

## 2019-08-04 RX ADMIN — ASPIRIN 325 MG: 325 TABLET, FILM COATED ORAL at 04:15

## 2019-08-04 RX ADMIN — OMEPRAZOLE 20 MG: 20 CAPSULE, DELAYED RELEASE ORAL at 16:46

## 2019-08-04 RX ADMIN — LISINOPRIL 20 MG: 20 TABLET ORAL at 04:15

## 2019-08-04 RX ADMIN — POLYETHYLENE GLYCOL 3350 1 PACKET: 17 POWDER, FOR SOLUTION ORAL at 16:00

## 2019-08-04 RX ADMIN — ENOXAPARIN SODIUM 40 MG: 100 INJECTION SUBCUTANEOUS at 16:45

## 2019-08-04 RX ADMIN — SENNOSIDES, DOCUSATE SODIUM 2 TABLET: 50; 8.6 TABLET, FILM COATED ORAL at 16:46

## 2019-08-04 RX ADMIN — AMLODIPINE BESYLATE 5 MG: 5 TABLET ORAL at 08:02

## 2019-08-04 RX ADMIN — Medication 100 MG: at 04:15

## 2019-08-04 RX ADMIN — CLOPIDOGREL BISULFATE 75 MG: 75 TABLET ORAL at 16:46

## 2019-08-04 RX ADMIN — CLONIDINE HYDROCHLORIDE 0.1 MG: 0.1 TABLET ORAL at 12:00

## 2019-08-04 RX ADMIN — OMEPRAZOLE 20 MG: 20 CAPSULE, DELAYED RELEASE ORAL at 04:15

## 2019-08-04 RX ADMIN — THERA TABS 1 TABLET: TAB at 04:15

## 2019-08-04 RX ADMIN — FOLIC ACID 1 MG: 1 TABLET ORAL at 04:15

## 2019-08-04 RX ADMIN — AMLODIPINE BESYLATE 5 MG: 5 TABLET ORAL at 04:15

## 2019-08-04 RX ADMIN — LISINOPRIL 20 MG: 20 TABLET ORAL at 16:46

## 2019-08-04 ASSESSMENT — LIFESTYLE VARIABLES
AUDITORY DISTURBANCES: NOT PRESENT
NAUSEA AND VOMITING: NO NAUSEA AND NO VOMITING
ORIENTATION AND CLOUDING OF SENSORIUM: ORIENTED AND CAN DO SERIAL ADDITIONS
AGITATION: NORMAL ACTIVITY
HEADACHE, FULLNESS IN HEAD: NOT PRESENT
PAROXYSMAL SWEATS: NO SWEAT VISIBLE
HEADACHE, FULLNESS IN HEAD: NOT PRESENT
VISUAL DISTURBANCES: NOT PRESENT
AUDITORY DISTURBANCES: NOT PRESENT
ANXIETY: NO ANXIETY (AT EASE)
TREMOR: NO TREMOR
TREMOR: NO TREMOR
AGITATION: NORMAL ACTIVITY
HEADACHE, FULLNESS IN HEAD: NOT PRESENT
TREMOR: NO TREMOR
NAUSEA AND VOMITING: NO NAUSEA AND NO VOMITING
ORIENTATION AND CLOUDING OF SENSORIUM: ORIENTED AND CAN DO SERIAL ADDITIONS
TOTAL SCORE: 1
ANXIETY: NO ANXIETY (AT EASE)
TOTAL SCORE: 0
AUDITORY DISTURBANCES: NOT PRESENT
AUDITORY DISTURBANCES: NOT PRESENT
VISUAL DISTURBANCES: NOT PRESENT
VISUAL DISTURBANCES: NOT PRESENT
AGITATION: NORMAL ACTIVITY
TOTAL SCORE: 0
TOTAL SCORE: 1
ORIENTATION AND CLOUDING OF SENSORIUM: CANNOT DO SERIAL ADDITIONS OR IS UNCERTAIN ABOUT DATE
ORIENTATION AND CLOUDING OF SENSORIUM: CANNOT DO SERIAL ADDITIONS OR IS UNCERTAIN ABOUT DATE
ANXIETY: NO ANXIETY (AT EASE)
PAROXYSMAL SWEATS: NO SWEAT VISIBLE
NAUSEA AND VOMITING: NO NAUSEA AND NO VOMITING
PAROXYSMAL SWEATS: NO SWEAT VISIBLE
HEADACHE, FULLNESS IN HEAD: NOT PRESENT
PAROXYSMAL SWEATS: NO SWEAT VISIBLE
AGITATION: NORMAL ACTIVITY
ANXIETY: NO ANXIETY (AT EASE)
SUBSTANCE_ABUSE: 1
TREMOR: NO TREMOR
VISUAL DISTURBANCES: NOT PRESENT
NAUSEA AND VOMITING: NO NAUSEA AND NO VOMITING

## 2019-08-04 ASSESSMENT — ENCOUNTER SYMPTOMS
CARDIOVASCULAR NEGATIVE: 1
RESPIRATORY NEGATIVE: 1
FOCAL WEAKNESS: 0
MUSCULOSKELETAL NEGATIVE: 1
GASTROINTESTINAL NEGATIVE: 1

## 2019-08-04 ASSESSMENT — PATIENT HEALTH QUESTIONNAIRE - PHQ9
1. LITTLE INTEREST OR PLEASURE IN DOING THINGS: NOT AT ALL
2. FEELING DOWN, DEPRESSED, IRRITABLE, OR HOPELESS: NOT AT ALL
SUM OF ALL RESPONSES TO PHQ9 QUESTIONS 1 AND 2: 0

## 2019-08-04 NOTE — PROGRESS NOTES
Pt resting in bed. He is alert and oriented to self only at this time. Staff redirecting as needed. Denies pain or needs. He is a bit restless at this time. On room air. Call light within reach. Per telemetry technician, pt has had 4 beats of Vtach, pt denies chest pain or SOB at this time. Will monitor closely.

## 2019-08-04 NOTE — PROGRESS NOTES
Pt has been calm most of the night. Ciwas 6, 0, 0 this shift. No prn ativan administered. He is cooperative with cares.    Monitor Summary, SR with occasional PVCs. HR 63 to 81 beats per minute. MD .16, QRS .10, will monitor.

## 2019-08-05 VITALS
RESPIRATION RATE: 16 BRPM | WEIGHT: 233.47 LBS | HEART RATE: 80 BPM | SYSTOLIC BLOOD PRESSURE: 150 MMHG | DIASTOLIC BLOOD PRESSURE: 77 MMHG | HEIGHT: 72 IN | TEMPERATURE: 97.9 F | OXYGEN SATURATION: 97 % | BODY MASS INDEX: 31.62 KG/M2

## 2019-08-05 PROBLEM — E87.1 HYPONATREMIA: Status: RESOLVED | Noted: 2019-08-01 | Resolved: 2019-08-05

## 2019-08-05 PROBLEM — I16.0 HYPERTENSIVE URGENCY: Status: RESOLVED | Noted: 2019-08-01 | Resolved: 2019-08-05

## 2019-08-05 PROBLEM — R53.1 ACUTE LEFT-SIDED WEAKNESS: Status: RESOLVED | Noted: 2019-08-01 | Resolved: 2019-08-05

## 2019-08-05 PROBLEM — I67.4 HYPERTENSIVE ENCEPHALOPATHY: Status: RESOLVED | Noted: 2019-08-02 | Resolved: 2019-08-05

## 2019-08-05 PROBLEM — R47.1 DYSARTHRIA: Status: RESOLVED | Noted: 2019-08-01 | Resolved: 2019-08-05

## 2019-08-05 PROCEDURE — 700102 HCHG RX REV CODE 250 W/ 637 OVERRIDE(OP): Performed by: HOSPITALIST

## 2019-08-05 PROCEDURE — 92526 ORAL FUNCTION THERAPY: CPT

## 2019-08-05 PROCEDURE — A9270 NON-COVERED ITEM OR SERVICE: HCPCS | Performed by: HOSPITALIST

## 2019-08-05 PROCEDURE — 97535 SELF CARE MNGMENT TRAINING: CPT

## 2019-08-05 PROCEDURE — 99239 HOSP IP/OBS DSCHRG MGMT >30: CPT | Performed by: HOSPITALIST

## 2019-08-05 PROCEDURE — 97530 THERAPEUTIC ACTIVITIES: CPT

## 2019-08-05 RX ORDER — LISINOPRIL 20 MG/1
20 TABLET ORAL 2 TIMES DAILY
Qty: 60 TAB | Refills: 0 | Status: SHIPPED | OUTPATIENT
Start: 2019-08-05

## 2019-08-05 RX ORDER — AMLODIPINE BESYLATE 10 MG/1
10 TABLET ORAL DAILY
Qty: 30 TAB | Refills: 0 | Status: SHIPPED | OUTPATIENT
Start: 2019-08-05

## 2019-08-05 RX ORDER — CLONIDINE HYDROCHLORIDE 0.1 MG/1
0.1 TABLET ORAL 2 TIMES DAILY
Qty: 60 TAB | Refills: 0 | Status: SHIPPED | OUTPATIENT
Start: 2019-08-05

## 2019-08-05 RX ADMIN — FOLIC ACID 1 MG: 1 TABLET ORAL at 04:51

## 2019-08-05 RX ADMIN — CLONIDINE HYDROCHLORIDE 0.1 MG: 0.1 TABLET ORAL at 04:51

## 2019-08-05 RX ADMIN — LISINOPRIL 20 MG: 20 TABLET ORAL at 04:51

## 2019-08-05 RX ADMIN — THERA TABS 1 TABLET: TAB at 04:51

## 2019-08-05 RX ADMIN — AMLODIPINE BESYLATE 10 MG: 5 TABLET ORAL at 04:52

## 2019-08-05 RX ADMIN — OMEPRAZOLE 20 MG: 20 CAPSULE, DELAYED RELEASE ORAL at 04:51

## 2019-08-05 RX ADMIN — SENNOSIDES, DOCUSATE SODIUM 2 TABLET: 50; 8.6 TABLET, FILM COATED ORAL at 04:51

## 2019-08-05 RX ADMIN — Medication 100 MG: at 04:52

## 2019-08-05 ASSESSMENT — COGNITIVE AND FUNCTIONAL STATUS - GENERAL
MOBILITY SCORE: 20
SUGGESTED CMS G CODE MODIFIER DAILY ACTIVITY: CI
WALKING IN HOSPITAL ROOM: A LITTLE
STANDING UP FROM CHAIR USING ARMS: A LITTLE
CLIMB 3 TO 5 STEPS WITH RAILING: A LITTLE
DAILY ACTIVITIY SCORE: 23
HELP NEEDED FOR BATHING: A LITTLE
SUGGESTED CMS G CODE MODIFIER MOBILITY: CJ
MOVING TO AND FROM BED TO CHAIR: A LITTLE

## 2019-08-05 ASSESSMENT — LIFESTYLE VARIABLES
NAUSEA AND VOMITING: NO NAUSEA AND NO VOMITING
ORIENTATION AND CLOUDING OF SENSORIUM: CANNOT DO SERIAL ADDITIONS OR IS UNCERTAIN ABOUT DATE
VISUAL DISTURBANCES: NOT PRESENT
NAUSEA AND VOMITING: NO NAUSEA AND NO VOMITING
PAROXYSMAL SWEATS: NO SWEAT VISIBLE
SUBSTANCE_ABUSE: 1
HEADACHE, FULLNESS IN HEAD: NOT PRESENT
ANXIETY: NO ANXIETY (AT EASE)
HEADACHE, FULLNESS IN HEAD: NOT PRESENT
AUDITORY DISTURBANCES: NOT PRESENT
AUDITORY DISTURBANCES: NOT PRESENT
ANXIETY: NO ANXIETY (AT EASE)
ORIENTATION AND CLOUDING OF SENSORIUM: CANNOT DO SERIAL ADDITIONS OR IS UNCERTAIN ABOUT DATE
TOTAL SCORE: 1
AGITATION: NORMAL ACTIVITY
TREMOR: NO TREMOR
VISUAL DISTURBANCES: NOT PRESENT
TOTAL SCORE: 1
AGITATION: NORMAL ACTIVITY
PAROXYSMAL SWEATS: NO SWEAT VISIBLE
TREMOR: NO TREMOR

## 2019-08-05 ASSESSMENT — GAIT ASSESSMENTS
ASSISTIVE DEVICE: FRONT WHEEL WALKER
DISTANCE (FEET): 300
GAIT LEVEL OF ASSIST: MINIMAL ASSIST
DEVIATION: BRADYKINETIC;SHUFFLED GAIT

## 2019-08-05 ASSESSMENT — ENCOUNTER SYMPTOMS
RESPIRATORY NEGATIVE: 1
FOCAL WEAKNESS: 0
CARDIOVASCULAR NEGATIVE: 1
GASTROINTESTINAL NEGATIVE: 1
MUSCULOSKELETAL NEGATIVE: 1

## 2019-08-05 ASSESSMENT — PATIENT HEALTH QUESTIONNAIRE - PHQ9
2. FEELING DOWN, DEPRESSED, IRRITABLE, OR HOPELESS: NOT AT ALL
1. LITTLE INTEREST OR PLEASURE IN DOING THINGS: NOT AT ALL
SUM OF ALL RESPONSES TO PHQ9 QUESTIONS 1 AND 2: 0

## 2019-08-05 NOTE — THERAPY
"Pt seen for PT tx session. Pt more alert and aware compared to inital PT evaluation. Trialed ambulation w/o AD and noted bradykinetic and shuffled gait. Overall gait mechanics and balance significantly improved with FWW. pt unable to recall if he has a FWW at home, spouse not present during PT session. Per OT, spouse is able to provide 24/7 supervision upon DC home. Pt has met acute care PT goals. Recommend home with spouse and home health as available/desired. PT will remain available only for DC needs.     Physical Therapy Treatment completed.   Bed Mobility:  Supine to Sit: (in chair pre/post PT session)  Transfers: Sit to Stand: Supervised  Gait: Level Of Assist: Minimal Assist(without AD -> SPV with FWW)       Plan of Care: PT to follow for DC needs  Discharge Recommendations: Equipment: Front-Wheel Walker.   Post-acute therapy: Recommend home with Home Health as desired     See \"Rehab Therapy-Acute\" Patient Summary Report for complete documentation.       "

## 2019-08-05 NOTE — PROGRESS NOTES
[facility] Medicine Daily Progress Note    Date of Service  8/4/2019    Chief Complaint  74 y.o. male admitted 8/1/2019 with confusion      Interval Problem Update  bp was markedly elevated on admit     bp is still elevated despite medication titration    Mri brain negative for infarct     SNF in [facility] pending    Unsteady gait persists    Memory issues persists        Consultants/Specialty  Dr smith neuro  Code Status  full    Disposition  pending    Review of Systems  Review of Systems   Constitutional: Negative for malaise/fatigue.   HENT: Negative.    Respiratory: Negative.    Cardiovascular: Negative.    Gastrointestinal: Negative.    Genitourinary: Negative.    Musculoskeletal: Negative.    Neurological: Negative for focal weakness.   Psychiatric/Behavioral: Positive for substance abuse.   All other systems reviewed and are negative.       Physical Exam  Temp:  [35.8 °C (96.5 °F)-37 °C (98.6 °F)] 35.8 °C (96.5 °F)  Pulse:  [70-85] 70  Resp:  [16-18] 18  BP: (112-180)/(68-94) 134/68  SpO2:  [95 %-98 %] 96 %    Physical Exam   Constitutional: No distress.   HENT:   Head: Normocephalic and atraumatic.   Eyes: Pupils are equal, round, and reactive to light. Left eye exhibits no discharge.   Neck: No thyromegaly present.   Cardiovascular: Normal rate, regular rhythm and intact distal pulses. Exam reveals no friction rub.   No murmur heard.  Pulmonary/Chest: Effort normal and breath sounds normal.   Abdominal: Soft. He exhibits distension.   Neurological: He is alert.   Oriented to self and place but not time    ataxia   Skin: Skin is warm. He is not diaphoretic.   Psychiatric: He has a normal mood and affect.       Fluids    Intake/Output Summary (Last 24 hours) at 8/4/2019 1912  Last data filed at 8/4/2019 1300  Gross per 24 hour   Intake 1140 ml   Output --   Net 1140 ml       Laboratory  Recent Labs     08/02/19  0345   WBC 6.0   RBC 3.63*   HEMOGLOBIN 12.4*   HEMATOCRIT 35.6*   MCV 98.1*   MCH 34.2*   MCHC 34.8    RDW 48.5   PLATELETCT 157*   MPV 9.6     Recent Labs     08/02/19  0345   SODIUM 132*   POTASSIUM 3.8   CHLORIDE 100   CO2 24   GLUCOSE 89   BUN 13   CREATININE 1.20   CALCIUM 9.1             Recent Labs     08/02/19  0345   TRIGLYCERIDE 48   HDL 91   LDL 61       Imaging  EC-ECHOCARDIOGRAM COMPLETE W/ CONT   Final Result      MR-BRAIN-W/O   Final Result      1.  Study is degraded by patient motion.   2.  No acute large major vascular territory infarct or hemorrhage is seen.   3.  Generalized atrophy. There is ventriculomegaly which appears out of proportion to the degree of sulcal prominence. Cannot exclude underlying communicating hydrocephalus, NPH.   4.  Periventricular T2 hyperintensities are nonspecific, most likely chronic microvascular ischemic changes.      DX-CHEST-PORTABLE (1 VIEW)   Final Result      Borderline cardiomegaly.      CT-CTA HEAD WITH & W/O-POST PROCESS   Final Result      CT angiogram of the [place] within normal limits.   Cerebral atrophy and central microvascular ischemic changes.   No acute intracranial hemorrhage.      CT-CTA NECK WITH & W/O-POST PROCESSING   Final Result      1.  Atherosclerotic plaque at the carotid bifurcations bilaterally with extension into the internal carotid arteries. This results in less than 50% diameter narrowing.      2.  Scattered atherosclerotic plaque involving the vertebral arteries. No evidence of occlusion or dissection.      CT-CEREBRAL PERFUSION ANALYSIS   Final Result      1.  Cerebral blood flow less than 30% likely representing completed infarct = 0 mL.      2.  T Max more than 6 seconds likely representing combination of completed infarct and ischemia = 115 mL.      3.  Mismatched volume likely representing ischemic brain/penumbra = 115 mL      4.  Please note that the cerebral perfusion was performed on the limited brain tissue around the basal ganglia region. Infarct/ischemia outside the CT perfusion sections can be missed in this  study.      OUTSIDE IMAGES-CT HEAD   Final Result           Assessment/Plan  * Hypertensive encephalopathy- (present on admission)  Assessment & Plan  lisinopril and norvasc     Added clonidine    Overweight (BMI 25.0-29.9)- (present on admission)  Assessment & Plan  Body mass index is 29.84 kg/m².    Weight loss and exercise recommended    Hyponatremia- (present on admission)  Assessment & Plan  monitor      Alcohol abuse- (present on admission)  Assessment & Plan  Family states the patient drinks a case (12 beers) of Budweiser beer a day  Watch for withdrawals      Thiamine daily    Hypertensive urgency  Assessment & Plan     home medications titrated    norvasc  10mg po daily    Lisinopril 20 bid      Added clonidine 0.1 bid on 8/4    Acute left-sided weakness- (present on admission)  Assessment & Plan  Resolved    Doubt stroke..likely due to elevated BP        Dysarthria- (present on admission)  Assessment & Plan  Seems to be at baseline       VTE prophylaxis: scd

## 2019-08-05 NOTE — THERAPY
"Speech Language Therapy dysphagia treatment completed.   Functional Status:  The patient was seen for dysphagia therapy this date. The patient was awake, alert and sitting in bedside chair.  Patient agreeable to dysphagia therapy and stated he is tired of his \"not human food.\" The patient was able to follow simple pharyngeal squeeze and laryngeal elevation exercises with \"fair-good\" accuracy. The patient was given PO trials of nectars, purees, thin liquids, soft solids, mixed consistencies and dry solids. The patient presented with slightly prolonged mastication on dry solids and hard solids. Mixed consistency solid resulted in x1 throat clear which was eliminated with use of double swallow strategy. At this time, recommend patient upgrade to D3/thin liquids with swallow strategies. SLP following to ensure tolerance and follow through with swallow strategies.       Recommendations: 1) upgrade to D3/thin liquids with swallow strategies.    Plan of Care: Will benefit from Speech Therapy 3 times per week  Post-Acute Therapy: Recommend inpatient transitional care services for continued speech therapy services.        See \"Rehab Therapy-Acute\" Patient Summary Report for complete documentation.     "

## 2019-08-05 NOTE — FACE TO FACE
Face to Face Supporting Documentation - Home Health    The encounter with this patient was in whole or in part the primary reason for home health admission.    Date of encounter:   Patient:                    MRN:                       YOB: 2020  Phillip Christopher  [id number]  1945     Home health to see patient for:  Skilled Nursing care for assessment, interventions & education    Skilled need for:  New Onset Medical Diagnosis hypertensive encephalopathy    Skilled nursing interventions to include:  Comment: medications    Homebound status evidenced by:  Need the aid of supportive devices such as crutches, canes, wheelchairs or walkers. Leaving home requires a considerable and taxing effort. There is a normal inability to leave the home.    Community Physician to provide follow up care: Pcp Not In Computer     Optional Interventions? No      I certify the face to face encounter for this home health care referral meets the CMS requirements and the encounter/clinical assessment with the patient was, in whole, or in part, for the medical condition(s) listed above, which is the primary reason for home health care. Based on my clinical findings: the service(s) are medically necessary, support the need for home health care, and the homebound criteria are met.  I certify that this patient has had a face to face encounter by myself.  Bairon Robin M.D. - NPI: [id number]

## 2019-08-05 NOTE — PROGRESS NOTES
[facility] Medicine Daily Progress Note    Date of Service  8/5/2019    Chief Complaint  74 y.o. male admitted 8/1/2019 with confusion      Interval Problem Update  bp was markedly elevated on admit     bp is elevated but getting better    Mri brain negative for infarct     SNF in [facility] pending    Unsteady gait persists    Memory issues persists        Consultants/Specialty  Dr smith neuro  Code Status  full    Disposition  pending    Review of Systems  Review of Systems   Constitutional: Negative for malaise/fatigue.   HENT: Negative.    Respiratory: Negative.    Cardiovascular: Negative.    Gastrointestinal: Negative.    Genitourinary: Negative.    Musculoskeletal: Negative.    Neurological: Negative for focal weakness.   Psychiatric/Behavioral: Positive for substance abuse.   All other systems reviewed and are negative.       Physical Exam  Temp:  [35.8 °C (96.5 °F)-37.1 °C (98.7 °F)] 36.7 °C (98.1 °F)  Pulse:  [67-81] 67  Resp:  [16-18] 18  BP: (112-164)/(68-93) 144/79  SpO2:  [94 %-99 %] 99 %    Physical Exam   Constitutional: No distress.   HENT:   Head: Normocephalic and atraumatic.   Eyes: Pupils are equal, round, and reactive to light. Left eye exhibits no discharge.   Neck: No thyromegaly present.   Cardiovascular: Normal rate, regular rhythm and intact distal pulses. Exam reveals no friction rub.   No murmur heard.  Pulmonary/Chest: Effort normal and breath sounds normal.   Abdominal: Soft. He exhibits distension.   Neurological: He is alert.   Oriented to self and place but not time    ataxia   Skin: Skin is warm. He is not diaphoretic.   Psychiatric: He has a normal mood and affect.       Fluids    Intake/Output Summary (Last 24 hours) at 8/5/2019 1229  Last data filed at 8/4/2019 1300  Gross per 24 hour   Intake 700 ml   Output --   Net 700 ml       Laboratory                        Imaging  EC-ECHOCARDIOGRAM COMPLETE W/ CONT   Final Result      MR-BRAIN-W/O   Final Result      1.  Study is degraded by  patient motion.   2.  No acute large major vascular territory infarct or hemorrhage is seen.   3.  Generalized atrophy. There is ventriculomegaly which appears out of proportion to the degree of sulcal prominence. Cannot exclude underlying communicating hydrocephalus, NPH.   4.  Periventricular T2 hyperintensities are nonspecific, most likely chronic microvascular ischemic changes.      DX-CHEST-PORTABLE (1 VIEW)   Final Result      Borderline cardiomegaly.      CT-CTA HEAD WITH & W/O-POST PROCESS   Final Result      CT angiogram of the [place] within normal limits.   Cerebral atrophy and central microvascular ischemic changes.   No acute intracranial hemorrhage.      CT-CTA NECK WITH & W/O-POST PROCESSING   Final Result      1.  Atherosclerotic plaque at the carotid bifurcations bilaterally with extension into the internal carotid arteries. This results in less than 50% diameter narrowing.      2.  Scattered atherosclerotic plaque involving the vertebral arteries. No evidence of occlusion or dissection.      CT-CEREBRAL PERFUSION ANALYSIS   Final Result      1.  Cerebral blood flow less than 30% likely representing completed infarct = 0 mL.      2.  T Max more than 6 seconds likely representing combination of completed infarct and ischemia = 115 mL.      3.  Mismatched volume likely representing ischemic brain/penumbra = 115 mL      4.  Please note that the cerebral perfusion was performed on the limited brain tissue around the basal ganglia region. Infarct/ischemia outside the CT perfusion sections can be missed in this study.      OUTSIDE IMAGES-CT HEAD   Final Result           Assessment/Plan  * Hypertensive encephalopathy- (present on admission)  Assessment & Plan  lisinopril and norvasc     clonidine    Overweight (BMI 25.0-29.9)- (present on admission)  Assessment & Plan  Body mass index is 29.84 kg/m².    Weight loss and exercise recommended    Hyponatremia- (present on admission)  Assessment &  Plan  monitor      Alcohol abuse- (present on admission)  Assessment & Plan  Family states the patient drinks a case (12 beers) of Budweiser beer a day  Watch for withdrawals      Thiamine daily    Hypertensive urgency- (present on admission)  Assessment & Plan     home medications titrated    norvasc  10mg po daily    Lisinopril 20 bid      Added clonidine 0.1 bid on 8/4    Acute left-sided weakness- (present on admission)  Assessment & Plan  Resolved    Doubt stroke..likely due to elevated BP        Dysarthria- (present on admission)  Assessment & Plan  Seems to be at baseline       VTE prophylaxis: scd

## 2019-08-05 NOTE — DISCHARGE PLANNING
Care Transition Team Assessment    The information gathered for this assessment was provided by pt and chart review. Pt lives in [place], [place] and was visiting in [facility]. Pt states that he lives in a one story home with his wife, Galina. Pt is retired and states that he has a good support system. Prior to being admitted into the hospital pt states that he was independent with ADL's/IADL's. Pt's wife, Galina reported that pt drinks 6 beers everyday. No mental health was reported. Pt states that he received HH in [place] when he had knee surgery.     No discharge plan in place at the time of this assessment.     Information Source  Orientation : Oriented x 4  Information Given By: Patient, Spouse  Informant's Name: (Phillip Christopher)  Who is responsible for making decisions for patient? : Patient    Elopement Risk  Legal Hold: No  Ambulatory or Self Mobile in Wheelchair: Yes  Disoriented: No  Psychiatric Symptoms: None  History of Wandering: No  Elopement this Admit: No  Vocalizing Wanting to Leave: No  Displays Behaviors, Body Language Wanting to Leave: No-Not at Risk for Elopement  Elopement Risk: Not at Risk for Elopement    Interdisciplinary Discharge Planning  Lives with - Patient's Self Care Capacity: Spouse  Patient or legal guardian wants to designate a caregiver (see row info): No  Prior Services: Unable To Determine At This Time    Discharge Preparedness  What is your plan after discharge?: Uncertain - pending medical team collaboration  What are your discharge supports?: Spouse  Prior Functional Level: Independent with Activities of Daily Living  Difficulity with ADLs: None  Difficulity with IADLs: None    Functional Assesment  Prior Functional Level: Independent with Activities of Daily Living    Finances  Financial Barriers to Discharge: No  Prescription Coverage: Yes    Vision / Hearing Impairment  Vision Impairment : Yes  Right Eye Vision: Impaired, Wears Glasses  Left Eye Vision: Impaired, Wears  Glasses  Hearing Impairment : No    Domestic Abuse  Have you ever been the victim of abuse or violence?: No  Physical Abuse or Sexual Abuse: No  Verbal Abuse or Emotional Abuse: No  Possible Abuse Reported to:: Not Applicable    Psychological Assessment  History of Substance Abuse: Alcohol  Date Last Used - Alcohol: (Last week)  Substance Abuse Comments: (6 beers a day.)  History of Psychiatric Problems: No  Non-compliant with Treatment: No    Discharge Risks or Barriers  Discharge risks or barriers?: No  Patient risk factors: Vulnerable adult    Anticipated Discharge Information  Anticipated discharge disposition: Discharge needs currently unknown

## 2019-08-05 NOTE — THERAPY
"Occupational Therapy Treatment completed with focus on ADLs, ADL transfers, patient education, caregiver training and cognition.  Functional Status: Transfers with FWW supv, LB dressing supv, standing grooming supv   Plan of Care: Discharge needs only; OT not actively following    Discharge Recommendations:  Equipment Front-Wheel Walker. Post-acute therapy: Recommend home health transitional care for continued occupational therapy services.     See \"Rehab Therapy-Acute\" Patient Summary Report for complete documentation.     Pt seen for OT tx. Received in bedside chair. Pt able to doff B socks, don hosp pants, re-don socks with supv. Mobilized to bathroom using FWW, completed toilet transfer (declined need to go). Completed standing oral care at sink. Pt now oriented to place, exact date, following commands well, but demos memory impairment. Spouse able to confirm pt's report of tub/shower with grab bar in place at home. Educated spouse on recommendation for supervised bathing, assist with med management (but involving pt in process of meds). Also recommended cognitive activities such as board games, word search, cards. Pt appears close to baseline function from OT standpoint in this setting. OT goals met. OT will not actively follow pt, but will be available for DC needs.   "

## 2019-08-05 NOTE — DIETARY
Anticipated Discharge Disposition: TBD    Action: LSW received message that Georgiana DIAZ CM with [facility] would like a clinical update. LSW tc Georgiana and was unable to speak to her. LSW was able to leave a name and number for call back.     Barriers to Discharge: None    Plan: LSW to f/u with Georgiana at a different time.

## 2019-08-05 NOTE — CARE PLAN
Problem: Safety  Goal: Will remain free from falls  Outcome: PROGRESSING AS EXPECTED  Note:   Call light within reach. Bed locked and in lowest position. Bed alarm on. Non slip socks on. Rounding maintained.      Problem: Bowel/Gastric:  Goal: Normal bowel function is maintained or improved  8/5/2019 0044 by Rhianna Garcias R.N.  Outcome: PROGRESSING AS EXPECTED  Flowsheets (Taken 8/4/2019 0800 by Brie Menezes, RBenitoN.)  Last BM: 08/04/19 8/5/2019 0039 by Rhianna Garcias R.N.  Outcome: PROGRESSING AS EXPECTED  Flowsheets (Taken 8/4/2019 0800 by Brie Menezes RBenitoN.)  Last BM: 08/04/19

## 2019-08-06 NOTE — DISCHARGE SUMMARY
Discharge Summary    CHIEF COMPLAINT ON ADMISSION  Chief Complaint   Patient presents with   • Possible Stroke     onset of confusion and difficulty speaking this morning at 0900.       Reason for Admission  Possible Stroke     Admission Date  8/1/2019    CODE STATUS  full    HPI & HOSPITAL COURSE  As per dr núñez h+p    74 y.o. overweight male with hypertension, dyslipidemia, prior TIAs, and a habit of drinking 12 beers a day who presented 8/1/2019 with last being seen with normal speech and function at approximately 9 AM in the morning.  Reportedly he had gone to a convenient store and Cecil and gisell Troy noted that he was having confusion and difficulty getting out of his car.  He is having some word finding difficulties and paramedics were called.  He was taken to [facility] and transferred to [facility] for emergent neurological intervention.  Here CTA of the head and neck did not show any embolic phenomenon.  Neurology has consulted and did not feel that he was TPA candidate.  The patient is having word finding difficulties currently.  Neurology felt that patient was having hypertensive encephalopathy.  Initial systolic blood pressures reportedly when the 200s in triage per nursing.  And up in [facility] no reports of systolics in the 230s.  The patient and his wife and daughter who are at bedside deny any knowledge of any recent falls or head trauma.    He was hospitalized at [facility]. Mri brain did not show any acute findings. His symptoms felt to be to severe elevated HTN. We adjusted his bp medications for better bpmcontrol  We gace thiamine for hx etoh abuse. He was seen by neuro MD and pt and ot. He was cleared to go home with home health. Patient refused home health        He was advised to cut back on his drinking    Therefore, he is discharged in good and stable condition to home with close outpatient follow-up.    The patient met 2-midnight criteria for an  inpatient stay at the time of discharge.    Discharge Date  8/5/2019    FOLLOW UP ITEMS POST DISCHARGE      DISCHARGE DIAGNOSES  Principal Problem (Resolved):    Hypertensive encephalopathy POA: Yes  Active Problems:    Alcohol abuse POA: Yes    Overweight (BMI 25.0-29.9) POA: Yes  Resolved Problems:    Dysarthria POA: Yes    Acute left-sided weakness POA: Yes    Hypertensive urgency POA: Yes    Hyponatremia POA: Yes      FOLLOW UP  No future appointments.  Pcp Not In Computer            MEDICATIONS ON DISCHARGE     Medication List      START taking these medications      Instructions   cloNIDine 0.1 MG Tabs  Commonly known as:  CATAPRES   Take 1 Tab by mouth 2 Times a Day.  Dose:  0.1 mg        CHANGE how you take these medications      Instructions   amLODIPine 10 MG Tabs  What changed:    · medication strength  · how much to take  Commonly known as:  NORVASC   Take 1 Tab by mouth every day.  Dose:  10 mg     lisinopril 20 MG Tabs  What changed:    · medication strength  · how much to take  Commonly known as:  PRINIVIL   Take 1 Tab by mouth 2 times a day.  Dose:  20 mg        CONTINUE taking these medications      Instructions   atorvastatin 40 MG Tabs  Commonly known as:  LIPITOR   Take 40 mg by mouth every day.  Dose:  40 mg     clopidogrel 75 MG Tabs  Commonly known as:  PLAVIX   Take 75 mg by mouth every day.  Dose:  75 mg            Allergies  No Known Allergies    DIET  No orders of the defined types were placed in this encounter.      ACTIVITY  As tolerated.  Weight bearing as tolerated    CONSULTATIONS  Dr smith neuro    PROCEDURES  Proceed  Nelda Smith 08/01/2019 03:08 PM PDT   Reprint Order Requisition     MR-BRAIN-W/O (Order #[id number]) on 8/1/19   Last Resulted Time   Fri Aug 2, 2019  5:21 AM   Imaging Result Status     Status: Final result (Exam End: 8/1/2019  6:20 PM)   Imaging Previous Results     Open Hard Copy Result Report (Order #[id number] - MR-BRAIN-W/O)   Narrative       8/1/2019 5:46  PM    HISTORY/REASON FOR EXAM:  stroke, confusion.  Left-sided weakness, hypertension, dysarthria    TECHNIQUE/EXAM DESCRIPTION:  MRI of the brain without contrast.    T1 sagittal, T2 fast spin-echo axial, T1 coronal, FLAIR coronal, diffusion-weighted and apparent diffusion coefficient (ADC map) axial images were obtained of the whole brain.    The study was performed on a Selatra Signa 1.5 Rufina MRI scanner.    COMPARISON:  None.    FINDINGS:  The study is limited by patient motion.    No definite area of restricted diffusion is seen to suggest acute major vascular territorial infarct. No large area of susceptibility artifact on GRE to suggest large hemorrhage.    There is generalized cerebral and cerebellar volume loss. There is ventriculomegaly which appears out of proportion to the degree of sulcal prominence at the vertex. Cannot exclude a degree of communicating hydrocephalus, NPH. There are confluent and   scattered periventricular and deep cerebral T2 hyperintensities which are nonspecific. These most likely represent chronic microvascular ischemic changes. A component of transependymal flow of CSF is less likely though difficult to exclude.    There is no significant mass effect or midline shift.    The proximal vascular flow voids are grossly patent.    Both globes demonstrate prior lens surgery.  There is mucosal thickening or retention cyst in the sphenoid sinus on the left.   Impression       1.  Study is degraded by patient motion.  2.  No acute large major vascular territory infarct or hemorrhage is seen.  3.  Generalized atrophy. There is ventriculomegaly which appears out of proportion to the degree of sulcal prominence. Cannot exclude underlying communicating hydrocephalus, NPH.  4.  Periventricular T2 hyperintensities are nonspecific, most likely chronic microvascular ischemic changes.         LABORATORY  Lab Results   Component Value Date    SODIUM 132 (L) 08/02/2019    POTASSIUM 3.8 08/02/2019     CHLORIDE 100 08/02/2019    CO2 24 08/02/2019    GLUCOSE 89 08/02/2019    BUN 13 08/02/2019    CREATININE 1.20 08/02/2019        Lab Results   Component Value Date    WBC 6.0 08/02/2019    HEMOGLOBIN 12.4 (L) 08/02/2019    HEMATOCRIT 35.6 (L) 08/02/2019    PLATELETCT 157 (L) 08/02/2019        Total time of the discharge process exceeds 38 minutes.

## 2019-08-06 NOTE — PROGRESS NOTES
FW walker was delivered and fitted to patient.  If any further assistance needed, please call extension [phone] or place order for Ortho Technician assistance as a communication order in [phone].

## 2019-08-06 NOTE — PROGRESS NOTES
Patient PIV removed and all DC instructions given. Patient to follow up with PCP and escorted to  by nursing staff.

## 2019-08-06 NOTE — DISCHARGE INSTRUCTIONS
Discharge Instructions    Discharged to home by car with relative. Discharged via wheelchair, hospital escort: Yes.  Special equipment needed: Walker    Be sure to schedule a follow-up appointment with your primary care doctor or any specialists as instructed.     Discharge Plan:   Influenza Vaccine Indication: Indicated: Not available from distributor/    I understand that a diet low in cholesterol, fat, and sodium is recommended for good health. Unless I have been given specific instructions below for another diet, I accept this instruction as my diet prescription.   Other diet: cut/chopped foods    Special Instructions:    · Is patient discharged on Warfarin / Coumadin?   No     Depression / Suicide Risk    As you are discharged from this [facility] facility, it is important to learn how to keep safe from harming yourself.    Recognize the warning signs:  · Abrupt changes in personality, positive or negative- including increase in energy   · Giving away possessions  · Change in eating patterns- significant weight changes-  positive or negative  · Change in sleeping patterns- unable to sleep or sleeping all the time   · Unwillingness or inability to communicate  · Depression  · Unusual sadness, discouragement and loneliness  · Talk of wanting to die  · Neglect of personal appearance   · Rebelliousness- reckless behavior  · Withdrawal from people/activities they love  · Confusion- inability to concentrate     If you or a loved one observes any of these behaviors or has concerns about self-harm, here's what you can do:  · Talk about it- your feelings and reasons for harming yourself  · Remove any means that you might use to hurt yourself (examples: pills, rope, extension cords, firearm)  · Get professional help from the community (Mental Health, Substance Abuse, psychological counseling)  · Do not be alone:Call your Safe Contact- someone whom you trust who will be there for you.  · Call your local  CRISIS HOTLINE 308-4112 or 742-673-8610  · Call your local Children's Mobile Crisis Response Team [place] (333) 308-2532 or www.[employer]  · Call the toll free National Suicide Prevention Hotlines   · National Suicide Prevention Lifeline 006-742-CMQH (5164)  · National Hope Line Network 800-SUICIDE (281-8597)    Follow up with PCP

## 2020-07-14 PROBLEM — I63.9 CVA (CEREBRAL VASCULAR ACCIDENT) (HCC): Status: ACTIVE | Noted: 2020-07-14

## 2020-07-14 PROBLEM — I10 HYPERTENSION: Status: ACTIVE | Noted: 2020-07-14

## 2020-07-14 PROBLEM — R29.90 ABNORMAL NEUROLOGICAL EXAM: Status: ACTIVE | Noted: 2020-07-14

## 2020-07-14 PROBLEM — R17 TOTAL BILIRUBIN, ELEVATED: Status: ACTIVE | Noted: 2020-07-14

## 2020-07-14 PROBLEM — E78.5 HYPERLIPIDEMIA: Status: ACTIVE | Noted: 2020-07-14

## 2020-07-14 PROBLEM — R74.8 ELEVATED SERUM ALKALINE PHOSPHATASE LEVEL: Status: ACTIVE | Noted: 2020-07-14

## 2020-07-21 PROBLEM — R17 TOTAL BILIRUBIN, ELEVATED: Status: RESOLVED | Noted: 2020-07-14 | Resolved: 2020-07-21

## 2020-07-21 PROBLEM — R74.8 ELEVATED SERUM ALKALINE PHOSPHATASE LEVEL: Status: RESOLVED | Noted: 2020-07-14 | Resolved: 2020-07-21

## 2020-07-21 PROBLEM — E87.1 HYPONATREMIA: Status: RESOLVED | Noted: 2019-08-01 | Resolved: 2020-07-21
